# Patient Record
Sex: FEMALE | Race: WHITE | Employment: FULL TIME | URBAN - METROPOLITAN AREA
[De-identification: names, ages, dates, MRNs, and addresses within clinical notes are randomized per-mention and may not be internally consistent; named-entity substitution may affect disease eponyms.]

---

## 2017-09-01 ENCOUNTER — OFFICE VISIT (OUTPATIENT)
Dept: FAMILY MEDICINE CLINIC | Age: 42
End: 2017-09-01

## 2017-09-01 ENCOUNTER — HOSPITAL ENCOUNTER (OUTPATIENT)
Dept: LAB | Age: 42
Discharge: HOME | End: 2017-09-01
Payer: COMMERCIAL

## 2017-09-01 VITALS
RESPIRATION RATE: 12 BRPM | HEIGHT: 64 IN | SYSTOLIC BLOOD PRESSURE: 124 MMHG | OXYGEN SATURATION: 96 % | WEIGHT: 269 LBS | HEART RATE: 69 BPM | DIASTOLIC BLOOD PRESSURE: 85 MMHG | BODY MASS INDEX: 45.93 KG/M2 | TEMPERATURE: 97.8 F

## 2017-09-01 DIAGNOSIS — Z01.419 ENCOUNTER FOR ROUTINE GYNECOLOGICAL EXAMINATION WITH PAPANICOLAOU SMEAR OF CERVIX: Primary | ICD-10-CM

## 2017-09-01 DIAGNOSIS — Z13.29 SCREENING FOR THYROID DISORDER: ICD-10-CM

## 2017-09-01 DIAGNOSIS — Z12.4 SCREENING FOR MALIGNANT NEOPLASM OF CERVIX: ICD-10-CM

## 2017-09-01 DIAGNOSIS — Z12.39 SCREENING FOR MALIGNANT NEOPLASM OF BREAST: ICD-10-CM

## 2017-09-01 DIAGNOSIS — Z00.00 ENCOUNTER FOR ANNUAL PHYSICAL EXAM: ICD-10-CM

## 2017-09-01 DIAGNOSIS — L20.9 ATOPIC DERMATITIS, UNSPECIFIED TYPE: ICD-10-CM

## 2017-09-01 PROCEDURE — 88175 CYTOPATH C/V AUTO FLUID REDO: CPT | Performed by: NURSE PRACTITIONER

## 2017-09-01 RX ORDER — MOMETASONE FUROATE 1 MG/G
CREAM TOPICAL
Qty: 15 G | Refills: 1 | Status: SHIPPED | OUTPATIENT
Start: 2017-09-01

## 2017-09-01 NOTE — PROGRESS NOTES
Chief Complaint   Patient presents with    Complete Physical    Gyn Exam      Last PAP was 3-4 years ago and normal.     Denies any history of abnormal pap smear. Denies any vaginal discharge or irritation. Denies any family history of breast cancer. Mom had colon cancer, diagnosed at age 62. Will need colonoscopy at age 50. Pt is fasting this morning. Denies any cp, sob, and dyspnea. Denies any dizziness. Has had more frequent HAs. Last eye exam was 1.5-2 years ago. Denies any n/v/c/d. Denies any urinary sx. Denies any other concerns at this time. Chief Complaint   Patient presents with    Complete Physical    Gyn Exam     she is a 37y.o. year old female who presents for evalution. Reviewed PmHx, RxHx, FmHx, SocHx, AllgHx and updated and dated in the chart.     Review of Systems - negative except as listed above in the HPI    Objective:     Vitals:    09/01/17 1052   BP: 124/85   Pulse: 69   Resp: 12   Temp: 97.8 °F (36.6 °C)   SpO2: 96%   Weight: 269 lb (122 kg)   Height: 5' 3.78\" (1.62 m)     Physical Examination: General appearance - alert, well appearing, and in no distress  Mental status - normal mood, behavior, speech, dress, motor activity, and thought processes  Eyes - pupils equal and reactive, extraocular eye movements intact  Ears - bilateral TM's and external ear canals normal  Nose - normal and patent, no erythema, discharge or polyps and normal nontender sinuses  Mouth - mucous membranes moist, pharynx normal without lesions  Neck - supple, no significant adenopathy, carotids upstroke normal bilaterally, no bruits, thyroid exam: thyroid is normal in size without nodules or tenderness  Chest - clear to auscultation, no wheezes, rales or rhonchi, symmetric air entry  Heart - normal rate, regular rhythm, normal S1, S2, no murmurs  Extremities - peripheral pulses normal, no pedal edema, no clubbing or cyanosis  Skin - normal coloration and turgor, no rashes, no suspicious skin lesions noted    Pelvic exam: VULVA: normal appearing vulva with no masses, tenderness or lesions, VAGINA: normal appearing vagina with normal color and discharge, no lesions, CERVIX: normal appearing cervix without discharge or lesions. Assessment/ Plan:   Diagnoses and all orders for this visit:    1. Encounter for routine gynecological examination with Papanicolaou smear of cervix    2. Encounter for annual physical exam  -     LIPID PANEL  -     METABOLIC PANEL, COMPREHENSIVE  -     CBC WITH AUTOMATED DIFF  Enc to follow a healthy diet and exercise as tolerated. 3. Screening for malignant neoplasm of cervix  -     PAP IG, RFX APTIMA HPV ASCUS ([id number]))  Will notify results and deviate plan based on findings. 4. Screening for malignant neoplasm of breast  -     [facility] SCREENING INCL CAD; Future  Enc pt to schedule her mammogram.   5. Screening for thyroid disorder  -     TSH 3RD GENERATION  Screening, asx.   6. Atopic dermatitis, unspecified type  -     mometasone (ELOCON) 0.1 % topical cream; Apply thin layer to affected areas on legs daily as needed. Apply as directed. Reviewed other supportive measures. Follow up if sx persist or worsen. Follow-up Disposition: Not on File    I have discussed the diagnosis with the patient and the intended plan as seen in the above orders. The patient has received an after-visit summary and questions were answered concerning future plans. Medication Side Effects and Warnings were discussed with patient: yes  Patient Labs were reviewed and or requested: yes  Patient Past Records were reviewed and or requested  yes  Patient / Caregiver Understanding of treatment plan was verbalized during office visit YES    SERGO Kikr    There are no Patient Instructions on file for this visit.

## 2017-09-01 NOTE — PROGRESS NOTES
Chief Complaint   Patient presents with    Complete Physical    Gyn Exam      Last PAP was 3-4 years ago and normal.

## 2017-09-01 NOTE — MR AVS SNAPSHOT
Visit Information Date & Time Provider Department Dept. Phone Encounter #  
 9/1/2017 10:30 AM Emperatriz Ho NP [address] [facility] [phone] [id number] Upcoming Health Maintenance Date Due DTaP/Tdap/Td series (1 - Tdap) 9/15/1997 PAP AKA CERVICAL CYTOLOGY 9/15/1997 INFLUENZA AGE 9 TO ADULT 8/1/2017 Allergies as of 9/1/2017  Review Complete On: 9/1/2017 By: Emperatriz Ho NP No Known Allergies Current Immunizations  Never Reviewed No immunizations on file. Not reviewed this visit You Were Diagnosed With   
  
 Codes Comments Encounter for routine gynecological examination with Papanicolaou smear of cervix    -  Primary ICD-10-CM: N09.248 ICD-9-CM: V72.31, V76.2 Encounter for annual physical exam     ICD-10-CM: Z00.00 ICD-9-CM: V70.0 Screening for malignant neoplasm of cervix     ICD-10-CM: Z12.4 ICD-9-CM: V76.2 Screening for malignant neoplasm of breast     ICD-10-CM: Z12.39 
ICD-9-CM: V76.10 Screening for thyroid disorder     ICD-10-CM: Z13.29 ICD-9-CM: V77.0 Atopic dermatitis, unspecified type     ICD-10-CM: L20.9 ICD-9-CM: 691.8 Vitals BP Pulse Temp Resp Height(growth percentile) Weight(growth percentile) 124/85 69 97.8 °F (36.6 °C) 12 5' 3.78\" (1.62 m) 269 lb (122 kg) LMP SpO2 BMI OB Status Smoking Status 08/28/2017 (Approximate) 96% 46.49 kg/m2 Having regular periods Never Smoker Vitals History BMI and BSA Data Body Mass Index Body Surface Area  
 46.49 kg/m 2 2.34 m 2 Preferred Pharmacy Pharmacy Name Phone [id number], [address] [phone] Your Updated Medication List  
  
   
This list is accurate as of: 9/1/17 11:23 AM.  Always use your most recent med list.  
  
  
  
  
 mometasone 0.1 % topical cream  
Commonly known as:  Lyndy Meuse Apply thin layer to affected areas on legs daily as needed. Prescriptions Sent to Pharmacy Refills  
 mometasone (ELOCON) 0.1 % topical cream 1 Sig: Apply thin layer to affected areas on legs daily as needed. Class: Normal  
 Pharmacy: [facility] [address] Ph #: [phone] We Performed the Following CBC WITH AUTOMATED DIFF [66867 CPT(R)] LIPID PANEL [16744 CPT(R)] METABOLIC PANEL, COMPREHENSIVE [59600 CPT(R)] PAP IG, RFX APTIMA HPV ASCUS ([id number])) [[id number] Custom] TSH 3RD GENERATION [94707 CPT(R)] To-Do List   
 09/24/2017 Imaging:  [facility] SCREEN[facility] INCL CAD Introducing [facility]! Anival Gore introduces [employer] patient portal. Now you can access parts of your medical record, email your doctor's office, and request medication refills online. 1. In your internet browser, go to [email or web address]. [employer] 2. Click on the First Time User? Click Here link in the Sign In box. You will see the New Member Sign Up page. 3. Enter your [employer] Access Code exactly as it appears below. You will not need to use this code after youve completed the sign-up process. If you do not sign up before the expiration date, you must request a new code. · [employer] Access Code: [email or web address] Expires: 11/30/2017 10:53 AM 
 
4. Enter the last four digits of your Social Security Number (xxxx) and Date of Birth (mm/dd/yyyy) as indicated and click Submit. You will be taken to the next sign-up page. 5. Create a [employer] ID. This will be your [employer] login ID and cannot be changed, so think of one that is secure and easy to remember. 6. Create a [employer] password. You can change your password at any time. 7. Enter your Password Reset Question and Answer. This can be used at a later time if you forget your password. 8. Enter your e-mail address. You will receive e-mail notification when new information is available in [address]. 9. Click Sign Up.  You can now view and download portions of your medical record. 10. Click the Download Summary menu link to download a portable copy of your medical information. If you have questions, please visit the Frequently Asked Questions section of the "[employer] website. Remember, "[employer] is NOT to be used for urgent needs. For medical emergencies, dial 911. Now available from your iPhone and Android! Please provide this summary of care documentation to your next provider. Your primary care clinician is listed as Marti Vargas. If you have any questions after today's visit, please call [phone].

## 2017-09-02 LAB
ALBUMIN SERPL-MCNC: 4.1 G/DL (ref 3.5–5.5)
ALBUMIN/GLOB SERPL: 1.4 {RATIO} (ref 1.2–2.2)
ALP SERPL-CCNC: 61 IU/L (ref 39–117)
ALT SERPL-CCNC: 19 IU/L (ref 0–32)
AST SERPL-CCNC: 15 IU/L (ref 0–40)
BASOPHILS # BLD AUTO: 0 X10E3/UL (ref 0–0.2)
BASOPHILS NFR BLD AUTO: 0 %
BILIRUB SERPL-MCNC: 0.3 MG/DL (ref 0–1.2)
BUN SERPL-MCNC: 11 MG/DL (ref 6–24)
BUN/CREAT SERPL: 14 (ref 9–23)
CALCIUM SERPL-MCNC: 9.1 MG/DL (ref 8.7–10.2)
CHLORIDE SERPL-SCNC: 99 MMOL/L (ref 96–106)
CHOLEST SERPL-MCNC: 174 MG/DL (ref 100–199)
CO2 SERPL-SCNC: 25 MMOL/L (ref 18–29)
CREAT SERPL-MCNC: 0.79 MG/DL (ref 0.57–1)
EOSINOPHIL # BLD AUTO: 0.1 X10E3/UL (ref 0–0.4)
EOSINOPHIL NFR BLD AUTO: 1 %
ERYTHROCYTE [DISTWIDTH] IN BLOOD BY AUTOMATED COUNT: 13.8 % (ref 12.3–15.4)
GLOBULIN SER CALC-MCNC: 2.9 G/DL (ref 1.5–4.5)
GLUCOSE SERPL-MCNC: 87 MG/DL (ref 65–99)
HCT VFR BLD AUTO: 41.1 % (ref 34–46.6)
HDLC SERPL-MCNC: 45 MG/DL
HGB BLD-MCNC: 13.5 G/DL (ref 11.1–15.9)
IMM GRANULOCYTES # BLD: 0 X10E3/UL (ref 0–0.1)
IMM GRANULOCYTES NFR BLD: 0 %
INTERPRETATION, 910389: NORMAL
LDLC SERPL CALC-MCNC: 104 MG/DL (ref 0–99)
LYMPHOCYTES # BLD AUTO: 2 X10E3/UL (ref 0.7–3.1)
LYMPHOCYTES NFR BLD AUTO: 28 %
MCH RBC QN AUTO: 29 PG (ref 26.6–33)
MCHC RBC AUTO-ENTMCNC: 32.8 G/DL (ref 31.5–35.7)
MCV RBC AUTO: 88 FL (ref 79–97)
MONOCYTES # BLD AUTO: 0.6 X10E3/UL (ref 0.1–0.9)
MONOCYTES NFR BLD AUTO: 8 %
NEUTROPHILS # BLD AUTO: 4.5 X10E3/UL (ref 1.4–7)
NEUTROPHILS NFR BLD AUTO: 63 %
PLATELET # BLD AUTO: 250 X10E3/UL (ref 150–379)
POTASSIUM SERPL-SCNC: 4 MMOL/L (ref 3.5–5.2)
PROT SERPL-MCNC: 7 G/DL (ref 6–8.5)
RBC # BLD AUTO: 4.66 X10E6/UL (ref 3.77–5.28)
SODIUM SERPL-SCNC: 137 MMOL/L (ref 134–144)
TRIGL SERPL-MCNC: 126 MG/DL (ref 0–149)
TSH SERPL DL<=0.005 MIU/L-ACNC: 1.88 UIU/ML (ref 0.45–4.5)
VLDLC SERPL CALC-MCNC: 25 MG/DL (ref 5–40)
WBC # BLD AUTO: 7.3 X10E3/UL (ref 3.4–10.8)

## 2017-09-05 ENCOUNTER — TELEPHONE (OUTPATIENT)
Dept: FAMILY MEDICINE CLINIC | Age: 42
End: 2017-09-05

## 2017-09-05 NOTE — PROGRESS NOTES
Please notify pt the followin. Cholesterol looks pretty good. LDL is a little high but a few diet changes could help improve this. Will send letter with results and recommendations. 2. Normal thyroid function. All other labs are normal. I recommend we repeat fasting labs in 1 year. Sending letter with results and recs for her records.

## 2017-10-05 ENCOUNTER — HOSPITAL ENCOUNTER (OUTPATIENT)
Dept: MAMMOGRAPHY | Age: 42
Discharge: HOME | End: 2017-10-05
Payer: COMMERCIAL

## 2017-10-05 DIAGNOSIS — Z12.39 SCREENING FOR MALIGNANT NEOPLASM OF BREAST: ICD-10-CM

## 2017-10-05 PROCEDURE — 77067 SCR MAMMO BI INCL CAD: CPT

## 2022-04-11 ENCOUNTER — VIRTUAL VISIT (OUTPATIENT)
Dept: FAMILY MEDICINE CLINIC | Age: 47
End: 2022-04-11
Payer: COMMERCIAL

## 2022-04-11 DIAGNOSIS — M79.604 RIGHT LEG PAIN: Primary | ICD-10-CM

## 2022-04-11 PROCEDURE — 99203 OFFICE O/P NEW LOW 30 MIN: CPT | Performed by: STUDENT IN AN ORGANIZED HEALTH CARE EDUCATION/TRAINING PROGRAM

## 2022-04-11 NOTE — PROGRESS NOTES
Yenni Hdz is a 40 y.o. female , id x 2(name and ). Reviewed questionnaires, and  medications.     Chief Complaint   Patient presents with    Leg Pain     (R) leg pain x 3 weeks        3 most recent PHQ Screens 2017   Little interest or pleasure in doing things Not at all   Feeling down, depressed, irritable, or hopeless Not at all   Total Score PHQ 2 0

## 2022-04-11 NOTE — PROGRESS NOTES
Progress Note    she is a 40y.o. year old female who presents for evalution. Assessment/ Plan:   Diagnoses and all orders for this visit:    1. Right leg pain    History consistent with muscular pain  Low suspicion for DVT  Ultrasound to assess for baker's cyst  Encouraged to use NSAIDs, heat, and stretching    Follow-up and Dispositions    · Return for in office with fasting labs. I have discussed the diagnosis with the patient and the intended plan as seen in the above orders. The patient has received an after-visit summary and questions were answered concerning future plans. Pt conveyed understanding of plan. Medication Side Effects and Warnings were discussed with patient         Subjective:     Chief Complaint   Patient presents with    Leg Pain     (R) leg pain x 3 weeks      Last seen in 2017    Pain in R leg near the knee with radiation to the calf   Medial aspect and posterior aspect of R knee   Described as cramping and stiff. Trouble to bend her leg. No tenderness   If she sits wrong it will shoot up the back of her leg. Sitting in 's truck (shorter distance from floor to seat)   Better with leg extension. No changes based on time of day. No swelling, sensory changes, skin changes, or weakness noted. Does not recall any injury  No medication or other interventions tried. Tried dubon linament at night. Thinks it is menthol based. Has been just dealing with it for 3 weeks. No changes in that time. Reviewed PmHx, RxHx, FmHx, SocHx, AllgHx and updated and dated in the chart. Review of Systems - negative except as listed above in the HPI      Objective: There were no vitals filed for this visit. Current Outpatient Medications   Medication Sig    mometasone (ELOCON) 0.1 % topical cream Apply thin layer to affected areas on legs daily as needed.  (Patient not taking: Reported on 4/11/2022)     No current facility-administered medications for this visit.       Physical Exam  Vitals and nursing note reviewed. Constitutional:       General: She is not in acute distress. Appearance: Normal appearance. She is not ill-appearing, toxic-appearing or diaphoretic. HENT:      Head: Normocephalic and atraumatic. Eyes:      General: No scleral icterus. Right eye: No discharge. Left eye: No discharge. Conjunctiva/sclera: Conjunctivae normal.   Pulmonary:      Effort: Pulmonary effort is normal. No respiratory distress. Musculoskeletal:      Cervical back: No rigidity. Skin:     Coloration: Skin is not jaundiced or pale. Findings: No rash (Of the visualized areas). Neurological:      Mental Status: She is alert. Comments: No dysarthria, facial asymmetry, or other gross neurological deficit appreciated within limits of virtual encounter   Psychiatric:         Mood and Affect: Mood normal.         Behavior: Behavior normal.         Thought Content: Thought content normal.         Judgment: Judgment normal.               I was in the office while conducting this encounter. Total Time: minutes: 11-20 minutes. Ely Christian is a 40 y.o. female being evaluated by a Virtual Visit (video visit) encounter to address concerns as mentioned above. A caregiver was present when appropriate. Due to this being a TeleHealth encounter ([facility]), evaluation of the following organ systems was limited: Vitals/Constitutional/EENT/Resp/CV/GI//MS/Neuro/Skin/Heme-Lymph-Imm. Pursuant to the emergency declaration under the [address] [place] [address] and the [employer] Act, this Virtual Visit was conducted with patient's (and/or legal guardian's) consent, to reduce the risk of exposure to COVID-19 and provide necessary medical care.       Services were provided through a video synchronous discussion virtually to substitute for in-person encounter. --Luis Alberto Acosta MD on 4/20/2022 at 11:14 AM    An electronic signature was used to authenticate this note.

## 2022-04-27 ENCOUNTER — HOSPITAL ENCOUNTER (OUTPATIENT)
Dept: ULTRASOUND IMAGING | Age: 47
Discharge: HOME | End: 2022-04-27
Attending: STUDENT IN AN ORGANIZED HEALTH CARE EDUCATION/TRAINING PROGRAM
Payer: COMMERCIAL

## 2022-04-27 DIAGNOSIS — M79.604 RIGHT LEG PAIN: ICD-10-CM

## 2022-04-27 PROCEDURE — 76882 US LMTD JT/FCL EVL NVASC XTR: CPT

## 2022-04-29 NOTE — PROGRESS NOTES
No sign of cyst or fluid collection behind right knee to explain symptoms. Continue NSAIDs, heat, and stretching. Recommend to see ortho for failure to resolve.

## 2022-05-18 ENCOUNTER — OFFICE VISIT (OUTPATIENT)
Dept: ORTHOPEDIC SURGERY | Age: 47
End: 2022-05-18
Payer: COMMERCIAL

## 2022-05-18 ENCOUNTER — HOSPITAL ENCOUNTER (OUTPATIENT)
Dept: GENERAL RADIOLOGY | Age: 47
Discharge: HOME | End: 2022-05-18
Payer: COMMERCIAL

## 2022-05-18 VITALS
SYSTOLIC BLOOD PRESSURE: 140 MMHG | WEIGHT: 293 LBS | DIASTOLIC BLOOD PRESSURE: 85 MMHG | HEART RATE: 78 BPM | HEIGHT: 64 IN | TEMPERATURE: 97.9 F | BODY MASS INDEX: 50.02 KG/M2 | OXYGEN SATURATION: 98 %

## 2022-05-18 DIAGNOSIS — S96.091A: Primary | ICD-10-CM

## 2022-05-18 DIAGNOSIS — M25.561 RIGHT KNEE PAIN, UNSPECIFIED CHRONICITY: ICD-10-CM

## 2022-05-18 DIAGNOSIS — M25.561 RIGHT KNEE PAIN, UNSPECIFIED CHRONICITY: Primary | ICD-10-CM

## 2022-05-18 PROCEDURE — 73564 X-RAY EXAM KNEE 4 OR MORE: CPT

## 2022-05-18 PROCEDURE — 99203 OFFICE O/P NEW LOW 30 MIN: CPT | Performed by: ORTHOPAEDIC SURGERY

## 2022-05-18 NOTE — PROGRESS NOTES
5/18/2022      CC: Right calf pain    HPI:      This is a 40y.o. year old female who has a 2-month history of posterior pain in the right calf. She was ambulating and felt a pop in her posterior calf. She has had pain since then, its been better recently, she has been doing some massages. No other abnormalities or treatments. She is able to ambulate independently, her pain is managed. PMH:  History reviewed. No pertinent past medical history. PSxHx:  Past Surgical History:   Procedure Laterality Date    HX GASTRIC BYPASS  2000       Meds:    Current Outpatient Medications:     mometasone (ELOCON) 0.1 % topical cream, Apply thin layer to affected areas on legs daily as needed. (Patient not taking: Reported on 4/11/2022), Disp: 15 g, Rfl: 1    All:  No Known Allergies    Social Hx:  Social History     Socioeconomic History    Marital status:    Tobacco Use    Smoking status: Never Smoker    Smokeless tobacco: Never Used   Substance and Sexual Activity    Alcohol use: Yes    Drug use: No    Sexual activity: Not Currently       Family Hx:  Family History   Problem Relation Age of Onset   Deniseda Lakeridge Cancer Mother     No Known Problems Father     Diabetes Maternal Uncle          Review of Systems:       General: Denies headache, lethargy, fever, weight loss  Ears/Nose/Throat: Denies ear discharge, drainage, nosebleeds, hoarse voice, dental problems  Cardiovascular: Denies chest pain, shortness of breath  Lungs: Denies chest pain, breathing problems, wheezing, pneumonia  Stomach: Denies stomach pain, heartburn, constipation, irritable bowel  Skin: Denies rash, sores, open wounds  Musculoskeletal: Right calf pain  Genitourinary: Denies dysuria, hematuria, polyuria  Gastrointestinal: Denies constipation, obstipation, diarrhea  Neurological: Denies changes in sight, smell, hearing, taste, seizures. Denies loss of consciousness.   Psychiatric: Denies depression, sleep pattern changes, anxiety, change in personality  Endocrine: Denies mood swings, heat or cold intolerance  Hematologic/Lymphatic: Denies anemia, purpura, petechia  Allergic/Immunologic: Denies swelling of throat, pain or swelling at lymph nodes      Physical Examination:    Visit Vitals  BP (!) 140/85 (BP 1 Location: Left upper arm, BP Patient Position: Sitting, BP Cuff Size: Large adult)   Pulse 78   Temp 97.9 °F (36.6 °C) (Tympanic)   Ht 5' 3.78\" (1.62 m)   Wt 297 lb (134.7 kg)   SpO2 98%   BMI 51.33 kg/m²        General: AOX3, no apparent distress  Psychiatric: mood and affect appropriate  Lungs: breathing is symmetric and unlabored bilaterally  Heart: regular rate and rhythm  Abdomen: no guarding  Head: normocephalic, atraumatic  Skin: No significant abnormalities, good turgor  Sensation intact to light touch: C5-T1 dermatomes  Muscular exam: 5/5 strength in all major muscle groups unless noted in specialty exam.    Extremities          Right lower extremity: No gross deformity. There is tenderness to palpation in the posterior medial gastrocnemius musculotendinous junction area. Negative Nolan test.  No restriction to range of motion of the hip, knee, ankle. Muscle bulk is appropriate without wasting. Sensation is intact to light touch in the L1-S1 dermatomes. Capillary refill is less than 2 seconds in the fingers. Strength testing is 5/5 at the major muscle groups of the hip, knee, ankle. Left lower extremity:  No gross deformity. No restriction to range of motion of the hip, knee, ankle. Muscle bulk is appropriate without wasting. Sensation is intact to light touch in the L1-S1 dermatomes. Capillary refill is less than 2 seconds in the fingers. Strength testing is 5/5 at the major muscle groups of the hip, knee, ankle.           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 Assessment: Partial rupture, musculotendinous junction right gastrocnemius medial head  Plan: This patient has above-mentioned issue, in general we discussed the anatomy as well as the timeframe of recovery which would be 3 months or more, I will get her into a physical therapy program, she will ambulate to her tolerance, she can increase her activities to her overall pain control level. We will see her in 6 weeks for repeat clinical check, hopefully at that point she is essentially asymptomatic. Ms. Christal Sherwood has a reminder for a \"due or due soon\" health maintenance. I have asked that she contact her primary care provider for follow-up on this health maintenance.

## 2022-05-18 NOTE — PROGRESS NOTES
Identified pt with two pt identifiers (name and ). Reviewed chart in preparation for visit and have obtained necessary documentation. Camille Mason is a 40 y.o. female  Chief Complaint   Patient presents with    Knee Pain     RT Knee/calf     Visit Vitals  BP (!) 140/85 (BP 1 Location: Left upper arm, BP Patient Position: Sitting, BP Cuff Size: Large adult)   Pulse 78   Temp 97.9 °F (36.6 °C) (Tympanic)   Ht 5' 3.78\" (1.62 m)   Wt 297 lb (134.7 kg)   SpO2 98%   BMI 51.33 kg/m²     1. Have you been to the ER, urgent care clinic since your last visit? Hospitalized since your last visit? No    2. Have you seen or consulted any other health care providers outside of the [address] [place] [address] since your last visit? Include any pap smears or colon screening.  No

## 2022-10-27 ENCOUNTER — TELEPHONE (OUTPATIENT)
Dept: FAMILY MEDICINE CLINIC | Age: 47
End: 2022-10-27

## 2022-10-27 NOTE — TELEPHONE ENCOUNTER
----- Message from [facility] sent at 10/27/2022  8:25 AM EDT -----  Subject: Message to Provider    QUESTIONS  Information for Provider? Pt could not get into the building due to   construction. She was calling to cancel appt so she could not be charged   or consider no show.   ---------------------------------------------------------------------------  --------------  Cristine Ram [facility]  [phone]; OK to leave message on voicemail  ---------------------------------------------------------------------------  --------------  SCRIPT ANSWERS  Relationship to Patient?  Self

## 2022-10-31 ENCOUNTER — OFFICE VISIT (OUTPATIENT)
Dept: FAMILY MEDICINE CLINIC | Age: 47
End: 2022-10-31
Payer: COMMERCIAL

## 2022-10-31 VITALS
HEIGHT: 64 IN | WEIGHT: 293 LBS | OXYGEN SATURATION: 98 % | HEART RATE: 80 BPM | BODY MASS INDEX: 50.02 KG/M2 | DIASTOLIC BLOOD PRESSURE: 84 MMHG | RESPIRATION RATE: 18 BRPM | SYSTOLIC BLOOD PRESSURE: 142 MMHG | TEMPERATURE: 98 F

## 2022-10-31 DIAGNOSIS — R03.0 ELEVATED BLOOD PRESSURE READING IN OFFICE WITHOUT DIAGNOSIS OF HYPERTENSION: ICD-10-CM

## 2022-10-31 DIAGNOSIS — Z11.59 ENCOUNTER FOR HEPATITIS C SCREENING TEST FOR LOW RISK PATIENT: ICD-10-CM

## 2022-10-31 DIAGNOSIS — E66.01 MORBID OBESITY WITH BMI OF 50.0-59.9, ADULT (HCC): ICD-10-CM

## 2022-10-31 DIAGNOSIS — Z80.0 FAMILY HISTORY OF COLON CANCER IN MOTHER: ICD-10-CM

## 2022-10-31 DIAGNOSIS — Z12.11 SCREENING FOR MALIGNANT NEOPLASM OF COLON: ICD-10-CM

## 2022-10-31 DIAGNOSIS — Z00.00 ENCOUNTER FOR ANNUAL PHYSICAL EXAM: Primary | ICD-10-CM

## 2022-10-31 PROCEDURE — 99396 PREV VISIT EST AGE 40-64: CPT | Performed by: NURSE PRACTITIONER

## 2022-10-31 NOTE — PROGRESS NOTES
Chief Complaint   Patient presents with    Physical    Labs    Form Completion       1. Patient in office today for cpe and fasting labs. Pt needs form completed for insurance. Have gyn exam in February with provider. Pt declines flu shot. Have no concerns. 1. Have you been to the ER, urgent care clinic since your last visit? Hospitalized since your last visit? No    2. Have you seen or consulted any other health care providers outside of the [address] [place] [address] since your last visit? Include any pap smears or colon screening.  No

## 2022-10-31 NOTE — PATIENT INSTRUCTIONS
Try eating a high protein breakfast. I recommend EGGS since they are high in protein. Try eating 1 egg with 2 egg whites. That should keep you feeling nice and full all morning. You could also try nonfat greek yogurt with a small amount of the trail mix or granola. I recommend eating an apple as a mid morning snack. You can add a little bit of peanut butter or almond butter (less sweet option) for the extra protein. For lunch I recommend a protein like fish, chicken, turkey breast, etc. If you are going to have a sandwich try whole grain bread and start with half a sandwich to see if that satiates you. I think wraps (like spinach wraps) are better. You can put as many veggies on it as you want like onion, tomato, lettuce. Try mustard instead of jones because jones is high in fat. If you need a crunchy/salty side try pop chips or fleipa chips. They are less fattening that potato chips. For a mid afternoon snack I recommend carrots with humus. Humus is so good for you and so filling due to the high protein. For dinner stick with the protein like fish, chicken or turkey. For starch I recommend quinoa, brown rice, sweet potato, or cous cous. Always eat a vegetable with your dinner. Spinach and kale are great because they fill you up and are full of vitamins and minerals. I know that its difficult to stop craving sweets in the evening. For this I recommend purple or green grapes because they will curb your craving. Pineapple is another delicious alternative. Another idea is any of those \"100 calorie\" desserts like skinny cow. Just make sure you only have one. Drink as much water as you possibly can all day long. Try adding fresh slices of lemon to curb cravings. Also purchase abraham seeds. If you sprinkle them on your food, they \"blow up\" in the stomach and make you feel cast faster.  I usually put in on yogurt or peanut butter toast. You can really put it on anything and you can find them pretty much everywhere these days. You should join [employer] at www.[employer].com. They have an endless supply of healthy recipes. Get a fit bit to track your steps. Try getting 10,000 steps per day. I recommend the [employer]. Consider joining [employer]! It's a great tool to help target what your barriers to weight loss may be. You can track your caloric intake and use the calorie budget. You can track energy expenditure and make sure you're burning enough calories throughout the day. It can help you set short and long term goals to achiever your target weight and guide you on how to keep the weight off!  It's a very useful tool :)

## 2022-10-31 NOTE — PROGRESS NOTES
Chief Complaint   Patient presents with    Physical    Labs    Form Completion     Patient in office today for cpe and fasting labs. Pt needs form completed for insurance. Have gyn exam in February with provider. Denies any history of abnormal pap smea. Pt declines flu shot. Denies any cp, sob, and dyspnea. Denies any ha or dizziness. Denies n/v/c/d. Denies any urinary sx. Health Maintenance Due   Topic Date Due    Hepatitis C Screening  Never done    COVID-19 Vaccine (1) 03/15/1976    DTaP/Tdap/Td series (1 - Tdap) Never done    Colorectal Cancer Screening Combo  Never done    Flu Vaccine (1) Never done    Cervical cancer screen  09/01/2022    Lipid Screen  09/01/2022     Mother had colon cancer. They recommended she get screened at 50, mom diagnosed at age 62. Denies any blood in the stool. Had a mammogram that was normal.     Diet and lifestyle could use improvement. Has previously done keto with great results but hard to maintain. Chief Complaint   Patient presents with    Physical    Labs    Form Completion     she is a 56y.o. year old female who presents for evalution. Reviewed PmHx, RxHx, FmHx, SocHx, AllgHx and updated and dated in the chart. Review of Systems - negative except as listed above in the HPI    Current Outpatient Medications   Medication Instructions    mometasone (ELOCON) 0.1 % topical cream Apply thin layer to affected areas on legs daily as needed.         Objective:     Vitals:    10/31/22 0940   BP: (!) 142/84   Pulse: 80   Resp: 18   Temp: 98 °F (36.7 °C)   TempSrc: Oral   SpO2: 98%   Weight: 298 lb (135.2 kg)   Height: 5' 3.78\" (1.62 m)     Physical Examination: General appearance - alert, well appearing, and in no distress  Mental status - normal mood, behavior  Eyes - pupils equal and reactive, extraocular eye movements intact  Ears - bilateral TM's and external ear canals normal  Nose - normal and patent, no erythema, discharge or polyps and normal nontender sinuses  Mouth - mucous membranes moist, pharynx normal without lesions  Neck - supple, no significant adenopathy, carotids upstroke normal bilaterally, no bruits, thyroid exam: thyroid is normal in size without nodules or tenderness  Chest - clear to auscultation, no wheezes, rales or rhonchi, symmetric air entry  Heart - normal rate, regular rhythm, normal S1, S2  Abdomen - soft, nontender, nondistended, no masses or organomegaly  bowel sounds normal  Extremities - peripheral pulses normal, trace ankle edema, no clubbing or cyanosis  Skin - normal coloration and turgor    Assessment/ Plan:   Diagnoses and all orders for this visit:    1. Encounter for annual physical exam  -     LIPID PANEL; Future  -     METABOLIC PANEL, COMPREHENSIVE; Future  -     CBC WITH AUTOMATED DIFF; Future  -     TSH 3RD GENERATION; Future  -     HEMOGLOBIN A1C WITH EAG; Future  -     VITAMIN D, 25 HYDROXY; Future    2. Morbid obesity with BMI of 50.0-59.9, adult [facility])  The patient is asked to modify diet and lifestyle to facilitate weight loss and therefore avoid health risks that are associated with obesity. 3. Elevated blood pressure reading in office without diagnosis of hypertension  Slightly high BP today. Recommended she follow up in 4 weeks to recheck. If persistently high consider diuretic to also help improve her slight LE swelling / fluid retention. 4. Encounter for hepatitis C screening test for low risk patient  -     HEPATITIS C AB; Future  Screening, asx.   5. Family history of colon cancer in mother / 10. Screening for malignant neoplasm of colon  -     REFERRAL TO GASTROENTEROLOGY  Referral to GI to [facility] for initial screening colonoscopy. Follow-up and Dispositions    Return in about 4 weeks (around 11/28/2022) for recheck BP. I have discussed the diagnosis with the patient and the intended plan as seen in the above orders.   The patient has received an after-visit summary and questions were answered concerning future plans. Medication Side Effects and Warnings were discussed with patient: yes  Patient Labs were reviewed and or requested: yes  Patient Past Records were reviewed and or requested  yes  Patient / Caregiver Understanding of treatment plan was verbalized during office visit SERGO Villar    Patient Instructions   Try eating a high protein breakfast. I recommend EGGS since they are high in protein. Try eating 1 egg with 2 egg whites. That should keep you feeling nice and full all morning. You could also try nonfat greek yogurt with a small amount of the trail mix or granola. I recommend eating an apple as a mid morning snack. You can add a little bit of peanut butter or almond butter (less sweet option) for the extra protein. For lunch I recommend a protein like fish, chicken, turkey breast, etc. If you are going to have a sandwich try whole grain bread and start with half a sandwich to see if that satiates you. I think wraps (like spinach wraps) are better. You can put as many veggies on it as you want like onion, tomato, lettuce. Try mustard instead of jones because jones is high in fat. If you need a crunchy/salty side try pop chips or felipa chips. They are less fattening that potato chips. For a mid afternoon snack I recommend carrots with humus. Humus is so good for you and so filling due to the high protein. For dinner stick with the protein like fish, chicken or turkey. For starch I recommend quinoa, brown rice, sweet potato, or cous cous. Always eat a vegetable with your dinner. Spinach and kale are great because they fill you up and are full of vitamins and minerals. I know that its difficult to stop craving sweets in the evening. For this I recommend purple or green grapes because they will curb your craving. Pineapple is another delicious alternative. Another idea is any of those \"100 calorie\" desserts like skinny cow.  Just make sure you only have one.     Drink as much water as you possibly can all day long. Try adding fresh slices of lemon to curb cravings. Also purchase abraham seeds. If you sprinkle them on your food, they \"blow up\" in the stomach and make you feel cast faster. I usually put in on yogurt or peanut butter toast. You can really put it on anything and you can find them pretty much everywhere these days. You should join [employer] at www.pinterest.com. They have an endless supply of healthy recipes. Get a fit bit to track your steps. Try getting 10,000 steps per day. I recommend the [employer]. Consider joining [employer]! It's a great tool to help target what your barriers to weight loss may be. You can track your caloric intake and use the calorie budget. You can track energy expenditure and make sure you're burning enough calories throughout the day. It can help you set short and long term goals to achiever your target weight and guide you on how to keep the weight off!  It's a very useful tool :)

## 2022-11-01 LAB
25(OH)D3+25(OH)D2 SERPL-MCNC: 15.1 NG/ML (ref 30–100)
ALBUMIN SERPL-MCNC: 4.2 G/DL (ref 3.8–4.8)
ALBUMIN/GLOB SERPL: 1.4 {RATIO} (ref 1.2–2.2)
ALP SERPL-CCNC: 89 IU/L (ref 44–121)
ALT SERPL-CCNC: 17 IU/L (ref 0–32)
AST SERPL-CCNC: 14 IU/L (ref 0–40)
BASOPHILS # BLD AUTO: 0 X10E3/UL (ref 0–0.2)
BASOPHILS NFR BLD AUTO: 1 %
BILIRUB SERPL-MCNC: <0.2 MG/DL (ref 0–1.2)
BUN SERPL-MCNC: 11 MG/DL (ref 6–24)
BUN/CREAT SERPL: 16 (ref 9–23)
CALCIUM SERPL-MCNC: 9 MG/DL (ref 8.7–10.2)
CHLORIDE SERPL-SCNC: 105 MMOL/L (ref 96–106)
CHOLEST SERPL-MCNC: 174 MG/DL (ref 100–199)
CO2 SERPL-SCNC: 21 MMOL/L (ref 20–29)
CREAT SERPL-MCNC: 0.7 MG/DL (ref 0.57–1)
EGFR: 108 ML/MIN/1.73
EOSINOPHIL # BLD AUTO: 0.1 X10E3/UL (ref 0–0.4)
EOSINOPHIL NFR BLD AUTO: 2 %
ERYTHROCYTE [DISTWIDTH] IN BLOOD BY AUTOMATED COUNT: 15.5 % (ref 11.7–15.4)
EST. AVERAGE GLUCOSE BLD GHB EST-MCNC: 194 MG/DL
GLOBULIN SER CALC-MCNC: 3 G/DL (ref 1.5–4.5)
GLUCOSE SERPL-MCNC: 152 MG/DL (ref 70–99)
HBA1C MFR BLD: 8.4 % (ref 4.8–5.6)
HCT VFR BLD AUTO: 37.7 % (ref 34–46.6)
HCV AB S/CO SERPL IA: <0.1 S/CO RATIO (ref 0–0.9)
HDLC SERPL-MCNC: 42 MG/DL
HGB BLD-MCNC: 11.5 G/DL (ref 11.1–15.9)
IMM GRANULOCYTES # BLD AUTO: 0 X10E3/UL (ref 0–0.1)
IMM GRANULOCYTES NFR BLD AUTO: 1 %
IMP & REVIEW OF LAB RESULTS: NORMAL
LDLC SERPL CALC-MCNC: 113 MG/DL (ref 0–99)
LYMPHOCYTES # BLD AUTO: 1.4 X10E3/UL (ref 0.7–3.1)
LYMPHOCYTES NFR BLD AUTO: 22 %
MCH RBC QN AUTO: 24.1 PG (ref 26.6–33)
MCHC RBC AUTO-ENTMCNC: 30.5 G/DL (ref 31.5–35.7)
MCV RBC AUTO: 79 FL (ref 79–97)
MONOCYTES # BLD AUTO: 0.5 X10E3/UL (ref 0.1–0.9)
MONOCYTES NFR BLD AUTO: 7 %
NEUTROPHILS # BLD AUTO: 4.4 X10E3/UL (ref 1.4–7)
NEUTROPHILS NFR BLD AUTO: 67 %
PLATELET # BLD AUTO: 317 X10E3/UL (ref 150–450)
POTASSIUM SERPL-SCNC: 4.4 MMOL/L (ref 3.5–5.2)
PROT SERPL-MCNC: 7.2 G/DL (ref 6–8.5)
RBC # BLD AUTO: 4.77 X10E6/UL (ref 3.77–5.28)
SODIUM SERPL-SCNC: 143 MMOL/L (ref 134–144)
TRIGL SERPL-MCNC: 103 MG/DL (ref 0–149)
TSH SERPL DL<=0.005 MIU/L-ACNC: 1.48 UIU/ML (ref 0.45–4.5)
VLDLC SERPL CALC-MCNC: 19 MG/DL (ref 5–40)
WBC # BLD AUTO: 6.5 X10E3/UL (ref 3.4–10.8)

## 2022-11-03 ENCOUNTER — DOCUMENTATION ONLY (OUTPATIENT)
Dept: FAMILY MEDICINE CLINIC | Age: 47
End: 2022-11-03

## 2022-11-03 NOTE — PROGRESS NOTES
Faxed biometric screening form to [address] [place] [address] @ [phone]. Confirmation number [id number]. Original form placed in scan folder for central scanning.

## 2022-11-07 ENCOUNTER — DOCUMENTATION ONLY (OUTPATIENT)
Dept: FAMILY MEDICINE CLINIC | Age: 47
End: 2022-11-07

## 2022-11-07 ENCOUNTER — OFFICE VISIT (OUTPATIENT)
Dept: FAMILY MEDICINE CLINIC | Age: 47
End: 2022-11-07
Payer: COMMERCIAL

## 2022-11-07 VITALS
HEART RATE: 89 BPM | RESPIRATION RATE: 18 BRPM | WEIGHT: 293 LBS | OXYGEN SATURATION: 96 % | BODY MASS INDEX: 50.02 KG/M2 | TEMPERATURE: 98.1 F | HEIGHT: 64 IN | SYSTOLIC BLOOD PRESSURE: 126 MMHG | DIASTOLIC BLOOD PRESSURE: 80 MMHG

## 2022-11-07 DIAGNOSIS — E11.65 TYPE 2 DIABETES MELLITUS WITH HYPERGLYCEMIA, WITHOUT LONG-TERM CURRENT USE OF INSULIN (HCC): Primary | ICD-10-CM

## 2022-11-07 PROBLEM — R03.0 ELEVATED BLOOD PRESSURE READING IN OFFICE WITHOUT DIAGNOSIS OF HYPERTENSION: Status: RESOLVED | Noted: 2022-10-31 | Resolved: 2022-11-07

## 2022-11-07 PROCEDURE — 99213 OFFICE O/P EST LOW 20 MIN: CPT | Performed by: NURSE PRACTITIONER

## 2022-11-07 PROCEDURE — 3052F HG A1C>EQUAL 8.0%<EQUAL 9.0%: CPT | Performed by: NURSE PRACTITIONER

## 2022-11-07 RX ORDER — DAPAGLIFLOZIN AND METFORMIN HYDROCHLORIDE 5; 500 MG/1; MG/1
1 TABLET, FILM COATED, EXTENDED RELEASE ORAL DAILY
Qty: 90 EACH | Refills: 1 | Status: SHIPPED | OUTPATIENT
Start: 2022-11-07 | End: 2022-11-10 | Stop reason: ALTCHOICE

## 2022-11-07 NOTE — PROGRESS NOTES
Chief Complaint   Patient presents with    Abnormal Lab Results    Medication Evaluation       1. Patient in office today to discuss lab results and medication management for labs. Have no concerns. 1. Have you been to the ER, urgent care clinic since your last visit? Hospitalized since your last visit? No    2. Have you seen or consulted any other health care providers outside of the [address] [place] [address] since your last visit? Include any pap smears or colon screening.  No

## 2022-11-07 NOTE — PROGRESS NOTES
PA has been submitted for Xigduo XR 5-500mg on 11/7/2022 via [employer]. PA Case ID: [id number]  Will be notified of outcome via fax in 48-72 hrs.

## 2022-11-07 NOTE — PROGRESS NOTES
Chief Complaint   Patient presents with    Abnormal Lab Results    Medication Evaluation     Patient in office today to discuss lab results and medication management for labs. Hgb a1c was 8.4 when pt came in for biometric screening. No previous history of diabetes. Does have a family history. Sees eye doctor yearly. Denies any other concerns at this time. Chief Complaint   Patient presents with    Abnormal Lab Results    Medication Evaluation     she is a 56y.o. year old female who presents for evalution. Reviewed PmHx, RxHx, FmHx, SocHx, AllgHx and updated and dated in the chart. Review of Systems - negative except as listed above in the HPI    Current Outpatient Medications   Medication Instructions    dapagliflozin-metformin (Xigduo XR) 5-500 mg TBph 1 Tablet, Oral, DAILY    mometasone (ELOCON) 0.1 % topical cream Apply thin layer to affected areas on legs daily as needed. Objective:     Vitals:    11/07/22 1448   BP: 126/80   Pulse: 89   Resp: 18   Temp: 98.1 °F (36.7 °C)   TempSrc: Oral   SpO2: 96%   Weight: 298 lb (135.2 kg)   Height: 5' 3.78\" (1.62 m)     Physical Examination: General appearance - alert, well appearing, and in no distress    Assessment/ Plan:   Diagnoses and all orders for this visit:    1. Type 2 diabetes mellitus with hyperglycemia, without long-term current use of insulin (HCC)  -     dapagliflozin-metformin (Xigduo XR) 5-500 mg TBph; Take 1 Tablet by mouth daily. Start xigduo daily. Reviewed SEs/ADRs of medication. Discussed diagnosis, treatment, goals of care, importance of diet and lifestyle, low carb diet recommendation, micro and macrovascular complications associated with poorly controlled type2 diabetes. Pt is motivated to get this number down. Follow up in 3 months for next set of labs. If no sig improvement agreeable to seeing diabetic educator and daily glucose monitoring.      Follow-up and Dispositions    Return in about 3 months (around 2/7/2023). I have discussed the diagnosis with the patient and the intended plan as seen in the above orders. The patient has received an after-visit summary and questions were answered concerning future plans. Medication Side Effects and Warnings were discussed with patient: yes  Patient Labs were reviewed and or requested: yes  Patient Past Records were reviewed and or requested  yes  Patient / Caregiver Understanding of treatment plan was verbalized during office visit YES    SERGO Monroy    There are no Patient Instructions on file for this visit.

## 2022-11-10 ENCOUNTER — DOCUMENTATION ONLY (OUTPATIENT)
Dept: FAMILY MEDICINE CLINIC | Age: 47
End: 2022-11-10

## 2022-11-10 DIAGNOSIS — E11.65 TYPE 2 DIABETES MELLITUS WITH HYPERGLYCEMIA, WITHOUT LONG-TERM CURRENT USE OF INSULIN (HCC): Primary | ICD-10-CM

## 2022-11-10 RX ORDER — METFORMIN HYDROCHLORIDE 500 MG/1
TABLET, EXTENDED RELEASE ORAL
Qty: 60 TABLET | Refills: 2 | Status: SHIPPED | OUTPATIENT
Start: 2022-11-10

## 2022-11-10 NOTE — PROGRESS NOTES
PA for Xidguo XR has been denied by insurance company,pt needs to have tried and failed metformin or metformin XR therapy.

## 2023-01-06 DIAGNOSIS — E11.65 TYPE 2 DIABETES MELLITUS WITH HYPERGLYCEMIA, WITHOUT LONG-TERM CURRENT USE OF INSULIN (HCC): ICD-10-CM

## 2023-01-06 RX ORDER — METFORMIN HYDROCHLORIDE 500 MG/1
TABLET, EXTENDED RELEASE ORAL
Qty: 60 TABLET | Refills: 2 | Status: SHIPPED | OUTPATIENT
Start: 2023-01-06

## 2023-02-19 ENCOUNTER — HOSPITAL ENCOUNTER (EMERGENCY)
Age: 48
Discharge: HOME | End: 2023-02-19
Attending: EMERGENCY MEDICINE
Payer: COMMERCIAL

## 2023-02-19 VITALS
RESPIRATION RATE: 18 BRPM | DIASTOLIC BLOOD PRESSURE: 78 MMHG | SYSTOLIC BLOOD PRESSURE: 138 MMHG | WEIGHT: 280 LBS | HEART RATE: 84 BPM | BODY MASS INDEX: 47.8 KG/M2 | HEIGHT: 64 IN | TEMPERATURE: 99 F | OXYGEN SATURATION: 100 %
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
## 2023-02-19 PROCEDURE — 99283 EMERGENCY DEPT VISIT LOW MDM: CPT

## 2023-02-19 RX ORDER — AMOXICILLIN AND CLAVULANATE POTASSIUM 875; 125 MG/1; MG/1
1 TABLET, FILM COATED ORAL 2 TIMES DAILY
Qty: 20 TABLET | Refills: 0 | Status: SHIPPED | OUTPATIENT
Start: 2023-02-19 | End: 2023-03-01

## 2023-02-19 NOTE — ED TRIAGE NOTES
Pt c/o cat bite to left middle and ring finger; bite occurred this morning; cat belongs to pt; not concerned about rabies

## 2023-02-19 NOTE — DISCHARGE INSTRUCTIONS
Your blood pressure was elevated in the emergency department today, monitor your blood pressure at home by taking it no more than 3 times a week when you are sitting, feel rested, and feel calm. Keep a record of these blood pressure readings and follow-up with your primary care doctor. Please return to the emergency department if you experience headache, lightheadedness/dizziness, chest pain, difficulty breathing. A prescription for antibiotics was sent to your pharmacy, it is important that you complete these antibiotics even if you start to feel better before they are finished. Please return to the emergency department if you develop fever, increased redness/swelling to your fingers, unable to bend your fingers, red streaking down your arm, if your symptoms do not start to improve after taking antibiotics for 48 hours, or for any other symptoms that are concerning to you. You declined the rabies vaccine/immunoglobulin in the ER today because the bite was from your cat and there was no concern for rabies. Please return to the emergency department soon as possible if you change your mind about this.

## 2023-02-19 NOTE — ED PROVIDER NOTES
[facility] EMERGENCY DEPARTMENT  EMERGENCY DEPARTMENT HISTORY AND PHYSICAL EXAM      Date: 2/19/2023  Patient Name: Jamee Steele  MRN: [id number]  YOB: 1975  Date of evaluation: 2/19/2023  Provider: Mervin Paget, NP   Note Started: 1:00 PM 2/19/23    HISTORY OF PRESENT ILLNESS     Chief Complaint   Patient presents with    Animal Bite       History Provided By: Patient    HPI: Jamee Steele, 56 y.o. female with past medical history of diabetes, presents ambulatory into the emergency department accompanied by her significant other, with complaints of cat bite to left third and fourth fingers. Patient reports startling her cat, and bit her earlier this morning. She states the cat is an indoor animal and has not had any exposure to any other animals, nor has it been outside; she is not sure when the cat's last rabies vaccine was, however she is declining rabies vaccines/immunoglobulin in the emergency department today because she feels confident that her cat has not been exposed to rabies. Denies any other injuries and reports being in her usual state of health. Upon arrival to the ED pt is alert and oriented x 3, well-appearing, and interacting appropriately; no obvious distress noted.       PAST MEDICAL HISTORY   Past Medical History:  Past Medical History:   Diagnosis Date    Diabetes ([address]        Past Surgical History:  Past Surgical History:   Procedure Laterality Date    HX BARIATRIC SURGERY      HX GASTRIC BYPASS  2000       Family History:  Family History   Problem Relation Age of Onset    Cancer Mother     No Known Problems Father     Diabetes Maternal Uncle        Social History:  Social History     Tobacco Use    Smoking status: Never    Smokeless tobacco: Never   Substance Use Topics    Alcohol use: Yes    Drug use: Never       Allergies:  No Known Allergies    PCP: Bridgette Meza NP    Current Meds:   Discharge Medication List as of 2/19/2023  1:14 PM        CONTINUE these medications which have NOT CHANGED    Details   metFORMIN ER (GLUCOPHAGE XR) 500 mg tablet TAKE 1 TAB BY MOUTH DAILY WITH DINNER. INCREASE TO 2 TABS ON DAY 8 IF TOLERATED., Normal, Disp-60 Tablet, R-2             REVIEW OF SYSTEMS   Review of Systems   Constitutional:  Negative for fever. Musculoskeletal:         Pain/wounds to distal left third and fourth fingers. Denies sensory changes. Skin:  Positive for wound. Superficial cat bites to distal left third and fourth fingers. Denies any other injuries. Positives and Pertinent negatives as per HPI. PHYSICAL EXAM     ED Triage Vitals [02/19/23 1239]   ED Encounter Vitals Group      BP (!) 183/89      Pulse (Heart Rate) 84      Resp Rate 18      Temp 99 °F (37.2 °C)      Temp src       O2 Sat (%) 100 %      Weight 280 lb      Height 5' 4\"      Physical Exam  Constitutional:       General: She is not in acute distress. Appearance: Normal appearance. She is not ill-appearing, toxic-appearing or diaphoretic. Skin:     General: Skin is warm and dry. Capillary Refill: Capillary refill takes less than 2 seconds. Comments: Superficial cat bite to left distal third and fourth fingers, mild swelling noted. No wounds drainage or erythema. Full range of motion to all digits, neurovascularly intact. Neurological:      Mental Status: She is alert and oriented to person, place, and time. Psychiatric:         Mood and Affect: Mood normal.         Behavior: Behavior normal.         Thought Content: Thought content normal.         Judgment: Judgment normal.       SCREENINGS               No data recorded      LAB, EKG AND DIAGNOSTIC RESULTS       PROCEDURES   Unless otherwise noted below, none.   Performed by: Nicole Young NP   Procedures      CRITICAL CARE TIME       ED COURSE and DIFFERENTIAL DIAGNOSIS/MDM   Vitals:    Vitals:    02/19/23 1239 02/19/23 1314   BP: (!) 183/89 138/78   Pulse: 84    Resp: 18    Temp: 99 °F (37.2 °C)    SpO2: 100%    Weight: 127 kg (280 lb)    Height: 5' 4\" (1.626 m)         Patient was given the following medications:  Medications - No data to display      CONSULTS: (Who and What was discussed)  None     Chronic Conditions: Diabetes  Social Determinants affecting Dx or Tx: None  Counseling: HYPERTENSION COUNSELING: Education was provided to the patient today regarding their hypertension. Patient is made aware of their elevated blood pressure and is instructed to follow up this week with their Primary Care for a recheck. Patient is counseled regarding consequences of chronic, uncontrolled hypertension including kidney disease, heart disease, stroke or even death. Patient states their understanding and agrees to follow up this week. Additionally, during their visit, I discussed sodium restriction, maintaining ideal body weight and regular exercise program as physiologic means to achieve blood pressure control. The patient will strive towards this. Records Reviewed (source and summary of external notes): Prior medical records and Nursing notes    CC/HPI Summary, DDx, ED Course, and Reassessment. Disposition Considerations (Tests not done, Shared Decision Making, Pt Expectation of Test or Treatment.):   presents ambulatory into the emergency department accompanied by her significant other, with complaints of cat bite to left third and fourth fingers. Patient reports startling her cat, and bit her earlier this morning. She states the cat is an indoor animal and has not had any exposure to any other animals, nor has it been outside; she is not sure when the cat's last rabies vaccine was, however she is declining rabies vaccines/immunoglobulin in the emergency department today because she feels confident that her cat has not been exposed to rabies. Denies any other injuries and reports being in her usual state of health. No visible or palpable foreign bodies noted; no imaging necessary at this time.   Full range of motion to all digits and neurovascularly intact. Wounds appear superficial, patient provided wound care prior to arrival.  Will prescribe 10 days of Augmentin, advised return precautions and PCP follow-up. Patient declining any pain medication. Shared decision making utilized, patient and her significant other are in agreement with plan of care. Declined rabies vaccine/immunoglobulin; informed patient about the risks associated with refusing this treatment, she and her  both verbalized understanding and continued to decline. Elevated BP reading in the ED today (183/89); patient asymptomatic. Blood pressure reading on 11/7/2022 at PCPs office was 126/80. Discussed the need to record her blood pressure readings throughout the week and follow-up with PCP regarding this. Advised return precautions and she verbalized understanding. FINAL IMPRESSION     1. Cat bite, initial encounter          DISPOSITION/PLAN   Discharged    Discharge Note: The patient is stable for discharge home. The signs, symptoms, diagnosis, and discharge instructions have been discussed, understanding conveyed, and agreed upon. The patient is to follow up as recommended or return to ER should their symptoms worsen. PATIENT REFERRED TO:  Follow-up Information       Follow up With Specialties Details Why Contact Info    Todd Arceo NP Nurse Practitioner Schedule an appointment as soon as possible for a visit in 2 days Reevaluation of cat bite and elevated blood pressure reading [address]  [address]  [phone]                DISCHARGE MEDICATIONS:  Discharge Medication List as of 2/19/2023  1:14 PM        START taking these medications    Details   amoxicillin-clavulanate (Augmentin) 875-125 mg per tablet Take 1 Tablet by mouth two (2) times a day for 10 days. , Normal, Disp-20 Tablet, R-0           CONTINUE these medications which have NOT CHANGED    Details   metFORMIN ER (GLUCOPHAGE XR) 500 mg tablet TAKE 1 TAB BY MOUTH DAILY WITH DINNER. INCREASE TO 2 TABS ON DAY 8 IF TOLERATED., Normal, Disp-60 Tablet, R-2               DISCONTINUED MEDICATIONS:  Discharge Medication List as of 2/19/2023  1:14 PM          I am the Primary Clinician of Record: Mary Beth Aguiar NP (electronically signed)    (Please note that parts of this dictation were completed with voice recognition software. Quite often unanticipated grammatical, syntax, homophones, and other interpretive errors are inadvertently transcribed by the computer software. Please disregards these errors.  Please excuse any errors that have escaped final proofreading.)

## 2023-03-30 ENCOUNTER — PATIENT MESSAGE (OUTPATIENT)
Dept: FAMILY MEDICINE CLINIC | Age: 48
End: 2023-03-30

## 2023-03-30 DIAGNOSIS — E11.65 TYPE 2 DIABETES MELLITUS WITH HYPERGLYCEMIA, WITHOUT LONG-TERM CURRENT USE OF INSULIN (HCC): ICD-10-CM

## 2023-04-02 RX ORDER — METFORMIN HYDROCHLORIDE 500 MG/1
TABLET, EXTENDED RELEASE ORAL
Qty: 60 TABLET | Refills: 2 | Status: SHIPPED | OUTPATIENT
Start: 2023-04-02

## 2023-07-20 ENCOUNTER — TELEPHONE (OUTPATIENT)
Age: 48
End: 2023-07-20

## 2023-07-20 DIAGNOSIS — E11.65 TYPE 2 DIABETES MELLITUS WITH HYPERGLYCEMIA, WITHOUT LONG-TERM CURRENT USE OF INSULIN (HCC): Primary | ICD-10-CM

## 2023-07-20 NOTE — TELEPHONE ENCOUNTER
We received a fax refill request for [facility]. Please escribe Metformin HCL ER to their pharmacy. The pharmacy is correct in the chart and they are requesting a 90 day supply.         Patient has an appt 11/3/23

## 2023-07-22 RX ORDER — METFORMIN HYDROCHLORIDE 500 MG/1
1000 TABLET, EXTENDED RELEASE ORAL DAILY
Qty: 60 TABLET | Refills: 0 | Status: SHIPPED | OUTPATIENT
Start: 2023-07-22

## 2023-08-21 ENCOUNTER — TELEPHONE (OUTPATIENT)
Age: 48
End: 2023-08-21

## 2023-08-21 DIAGNOSIS — E11.65 TYPE 2 DIABETES MELLITUS WITH HYPERGLYCEMIA, WITHOUT LONG-TERM CURRENT USE OF INSULIN (HCC): ICD-10-CM

## 2023-08-21 RX ORDER — METFORMIN HYDROCHLORIDE 500 MG/1
1000 TABLET, EXTENDED RELEASE ORAL DAILY
Qty: 60 TABLET | Refills: 0 | Status: SHIPPED | OUTPATIENT
Start: 2023-08-21

## 2023-08-21 NOTE — TELEPHONE ENCOUNTER
We received a fax refill request for [facility]. Please escribe Metformin HCL ER to their pharmacy. The pharmacy is correct in the chart and they are requesting a 30 day supply.   Refills: 11

## 2023-09-05 DIAGNOSIS — E11.65 TYPE 2 DIABETES MELLITUS WITH HYPERGLYCEMIA, WITHOUT LONG-TERM CURRENT USE OF INSULIN (HCC): ICD-10-CM

## 2023-09-06 RX ORDER — METFORMIN HYDROCHLORIDE 500 MG/1
TABLET, EXTENDED RELEASE ORAL
Qty: 60 TABLET | Refills: 11 | OUTPATIENT
Start: 2023-09-06

## 2023-09-11 ENCOUNTER — TELEPHONE (OUTPATIENT)
Age: 48
End: 2023-09-11

## 2023-09-11 DIAGNOSIS — E11.65 TYPE 2 DIABETES MELLITUS WITH HYPERGLYCEMIA, WITHOUT LONG-TERM CURRENT USE OF INSULIN (HCC): ICD-10-CM

## 2023-09-11 RX ORDER — METFORMIN HYDROCHLORIDE 500 MG/1
1000 TABLET, EXTENDED RELEASE ORAL DAILY
Qty: 180 TABLET | Refills: 0 | Status: SHIPPED | OUTPATIENT
Start: 2023-09-11

## 2023-09-11 NOTE — TELEPHONE ENCOUNTER
Patient would like to speak with nurse about having her Metformin denied. She stated she has a CPE 11.03.2023 and was hoping in the meantime she could have this refilled.  Patient's phone: [phone]

## 2023-09-11 NOTE — TELEPHONE ENCOUNTER
Diabetes was uncontrolled at last check 10/2022. Diabetes follow-up needed ASAP    May offer a double book 9:20, 10:20, 2:00.   Please ensure slot is not already double booked  Thank you

## 2023-09-18 ENCOUNTER — OFFICE VISIT (OUTPATIENT)
Age: 48
End: 2023-09-18
Payer: COMMERCIAL

## 2023-09-18 VITALS
WEIGHT: 291.8 LBS | HEART RATE: 86 BPM | HEIGHT: 64 IN | DIASTOLIC BLOOD PRESSURE: 84 MMHG | SYSTOLIC BLOOD PRESSURE: 140 MMHG | TEMPERATURE: 98.3 F | BODY MASS INDEX: 49.82 KG/M2 | OXYGEN SATURATION: 98 %

## 2023-09-18 DIAGNOSIS — E78.00 PURE HYPERCHOLESTEROLEMIA: ICD-10-CM

## 2023-09-18 DIAGNOSIS — I10 PRIMARY HYPERTENSION: ICD-10-CM

## 2023-09-18 DIAGNOSIS — E55.9 HYPOVITAMINOSIS D: ICD-10-CM

## 2023-09-18 DIAGNOSIS — E66.01 CLASS 3 SEVERE OBESITY WITH SERIOUS COMORBIDITY AND BODY MASS INDEX (BMI) OF 50.0 TO 59.9 IN ADULT, UNSPECIFIED OBESITY TYPE (HCC): ICD-10-CM

## 2023-09-18 DIAGNOSIS — E11.65 TYPE 2 DIABETES MELLITUS WITH HYPERGLYCEMIA, WITHOUT LONG-TERM CURRENT USE OF INSULIN (HCC): Primary | ICD-10-CM

## 2023-09-18 PROCEDURE — 3052F HG A1C>EQUAL 8.0%<EQUAL 9.0%: CPT | Performed by: STUDENT IN AN ORGANIZED HEALTH CARE EDUCATION/TRAINING PROGRAM

## 2023-09-18 PROCEDURE — 3074F SYST BP LT 130 MM HG: CPT | Performed by: STUDENT IN AN ORGANIZED HEALTH CARE EDUCATION/TRAINING PROGRAM

## 2023-09-18 PROCEDURE — 3078F DIAST BP <80 MM HG: CPT | Performed by: STUDENT IN AN ORGANIZED HEALTH CARE EDUCATION/TRAINING PROGRAM

## 2023-09-18 PROCEDURE — 99214 OFFICE O/P EST MOD 30 MIN: CPT | Performed by: STUDENT IN AN ORGANIZED HEALTH CARE EDUCATION/TRAINING PROGRAM

## 2023-09-18 RX ORDER — BIOTIN 10 MG
TABLET ORAL
COMMUNITY

## 2023-09-18 RX ORDER — ROSUVASTATIN CALCIUM 10 MG/1
10 TABLET, COATED ORAL NIGHTLY
Qty: 90 TABLET | Refills: 1 | Status: SHIPPED | OUTPATIENT
Start: 2023-09-18

## 2023-09-18 RX ORDER — LISINOPRIL 10 MG/1
10 TABLET ORAL DAILY
Qty: 90 TABLET | Refills: 1 | Status: SHIPPED | OUTPATIENT
Start: 2023-09-18

## 2023-09-18 NOTE — PATIENT INSTRUCTIONS
Do your best to primarily drink water. When eating, do not multitask. Pay attention to food and drink with all your senses. When you eat out, eat just half of it and wait 10 minutes before eating any more if you are hungry. Diabetesfoodhub.org    You should purchase a blood pressure cuff to check your blood pressure at home. Check first thing in the morning. You should be relaxed, seated with back supported, feet flat on the floor, arm with cuff resting at heart height. You should check your blood pressure 1-3 times. Please write down your blood pressures and bring this record and your blood pressure cuff/machine to your follow-up visit. I would like for your blood pressure to be less than 130 for the top number and less than 90 for the bottom number.    Send a navabi message in 2-3 weeks with your blood pressure readings

## 2023-09-18 NOTE — PROGRESS NOTES
Progress Note    she is a 53y.o. year old female who presents for evaluation of Diabetes        Assessment/ Plan:     1. Type 2 diabetes mellitus with hyperglycemia, without long-term current use of insulin (HCC)  On metformin 1 g daily  Reassess with labs  Encouraged to follow a diabetic diet  -     Comprehensive Metabolic Panel; Future  -     Lipid Panel; Future  -     Microalbumin / Creatinine Urine Ratio; Future  -     Hemoglobin A1C; Future    2. Hypovitaminosis D  -     Vitamin D 25 Hydroxy; Future    3. Class 3 severe obesity with serious comorbidity and body mass index (BMI) of 50.0 to 59.9 in adult, unspecified obesity type ([address])  Healthy diet encouraged to address diabetes and other chronic diseases  -     CBC; Future  -     TSH; Future    4. Primary hypertension  Blood pressure uncontrolled, start lisinopril and monitor BP at home  Encouraged to follow a DASH diet  -     lisinopril (PRINIVIL;ZESTRIL) 10 MG tablet; Take 1 tablet by mouth daily, Disp-90 tablet, R-1Normal    5. Pure hypercholesterolemia   10/2022. Goal with diabetes <70  Start Crestor as below  -     Lipid Panel; Future  -     rosuvastatin (CRESTOR) 10 MG tablet; Take 1 tablet by mouth nightly, Disp-90 tablet, R-1Normal      Return in about 3 months (around 12/18/2023) for follow-up diabetes. I have discussed the diagnosis with the patient and the intended plan as seen in the above orders. The patient has received an after-visit summary and questions were answered concerning future plans. Pt conveyed understanding of plan. Medication Side Effects and Warnings were discussed with patient        Subjective:     Chief Complaint   Patient presents with    Diabetes     Previously tried other medications ([employer]) but insurance would not approve them. No lifestyle changes made  She keeps snacks in the house for her . He can't sleep so they stay up late. He also has health and weight issues.   High in processed

## 2023-09-19 LAB
25(OH)D3 SERPL-MCNC: 22.3 NG/ML (ref 30–100)
ALBUMIN SERPL-MCNC: 3.4 G/DL (ref 3.5–5)
ALBUMIN/GLOB SERPL: 0.9 (ref 1.1–2.2)
ALP SERPL-CCNC: 78 U/L (ref 45–117)
ALT SERPL-CCNC: 22 U/L (ref 12–78)
ANION GAP SERPL CALC-SCNC: 4 MMOL/L (ref 5–15)
AST SERPL-CCNC: 13 U/L (ref 15–37)
BILIRUB SERPL-MCNC: 0.2 MG/DL (ref 0.2–1)
BUN SERPL-MCNC: 12 MG/DL (ref 6–20)
BUN/CREAT SERPL: 15 (ref 12–20)
CALCIUM SERPL-MCNC: 9 MG/DL (ref 8.5–10.1)
CHLORIDE SERPL-SCNC: 110 MMOL/L (ref 97–108)
CHOLEST SERPL-MCNC: 149 MG/DL
CO2 SERPL-SCNC: 26 MMOL/L (ref 21–32)
CREAT SERPL-MCNC: 0.8 MG/DL (ref 0.55–1.02)
CREAT UR-MCNC: 81.2 MG/DL
ERYTHROCYTE [DISTWIDTH] IN BLOOD BY AUTOMATED COUNT: 18.5 % (ref 11.5–14.5)
EST. AVERAGE GLUCOSE BLD GHB EST-MCNC: 183 MG/DL
GLOBULIN SER CALC-MCNC: 3.6 G/DL (ref 2–4)
GLUCOSE SERPL-MCNC: 136 MG/DL (ref 65–100)
HBA1C MFR BLD: 8 % (ref 4–5.6)
HCT VFR BLD AUTO: 34.5 % (ref 35–47)
HDLC SERPL-MCNC: 39 MG/DL
HDLC SERPL: 3.8 (ref 0–5)
HGB BLD-MCNC: 10.3 G/DL (ref 11.5–16)
LDLC SERPL CALC-MCNC: 87.8 MG/DL (ref 0–100)
MCH RBC QN AUTO: 24.2 PG (ref 26–34)
MCHC RBC AUTO-ENTMCNC: 29.9 G/DL (ref 30–36.5)
MCV RBC AUTO: 81 FL (ref 80–99)
MICROALBUMIN UR-MCNC: 5.31 MG/DL
MICROALBUMIN/CREAT UR-RTO: 65 MG/G (ref 0–30)
NRBC # BLD: 0 K/UL (ref 0–0.01)
NRBC BLD-RTO: 0 PER 100 WBC
PLATELET # BLD AUTO: 297 K/UL (ref 150–400)
PMV BLD AUTO: 10.9 FL (ref 8.9–12.9)
POTASSIUM SERPL-SCNC: 4.2 MMOL/L (ref 3.5–5.1)
PROT SERPL-MCNC: 7 G/DL (ref 6.4–8.2)
RBC # BLD AUTO: 4.26 M/UL (ref 3.8–5.2)
SODIUM SERPL-SCNC: 140 MMOL/L (ref 136–145)
TRIGL SERPL-MCNC: 111 MG/DL
TSH SERPL DL<=0.05 MIU/L-ACNC: 1.11 UIU/ML (ref 0.36–3.74)
VLDLC SERPL CALC-MCNC: 22.2 MG/DL
WBC # BLD AUTO: 7.9 K/UL (ref 3.6–11)

## 2023-09-20 PROBLEM — D64.9 NORMOCYTIC ANEMIA: Status: ACTIVE | Noted: 2023-09-20

## 2023-09-20 PROBLEM — R80.9 TYPE 2 DIABETES MELLITUS WITH MICROALBUMINURIA (HCC): Status: ACTIVE | Noted: 2023-09-20

## 2023-09-20 PROBLEM — E78.00 PURE HYPERCHOLESTEROLEMIA: Status: ACTIVE | Noted: 2023-09-20

## 2023-09-20 PROBLEM — E11.29 TYPE 2 DIABETES MELLITUS WITH MICROALBUMINURIA (HCC): Status: ACTIVE | Noted: 2023-09-20

## 2023-09-20 PROBLEM — E55.9 HYPOVITAMINOSIS D: Status: ACTIVE | Noted: 2023-09-20

## 2023-11-01 ENCOUNTER — TELEPHONE (OUTPATIENT)
Age: 48
End: 2023-11-01

## 2023-12-05 ENCOUNTER — OFFICE VISIT (OUTPATIENT)
Age: 48
End: 2023-12-05
Payer: COMMERCIAL

## 2023-12-05 VITALS
HEART RATE: 76 BPM | OXYGEN SATURATION: 97 % | TEMPERATURE: 98.4 F | SYSTOLIC BLOOD PRESSURE: 129 MMHG | HEIGHT: 64 IN | WEIGHT: 287 LBS | BODY MASS INDEX: 49 KG/M2 | DIASTOLIC BLOOD PRESSURE: 78 MMHG

## 2023-12-05 DIAGNOSIS — E11.65 TYPE 2 DIABETES MELLITUS WITH HYPERGLYCEMIA, WITHOUT LONG-TERM CURRENT USE OF INSULIN (HCC): ICD-10-CM

## 2023-12-05 DIAGNOSIS — E11.29 TYPE 2 DIABETES MELLITUS WITH MICROALBUMINURIA, WITHOUT LONG-TERM CURRENT USE OF INSULIN (HCC): ICD-10-CM

## 2023-12-05 DIAGNOSIS — B35.1 ONYCHOMYCOSIS: ICD-10-CM

## 2023-12-05 DIAGNOSIS — Z12.4 CERVICAL CANCER SCREENING: Primary | ICD-10-CM

## 2023-12-05 DIAGNOSIS — R80.9 TYPE 2 DIABETES MELLITUS WITH MICROALBUMINURIA, WITHOUT LONG-TERM CURRENT USE OF INSULIN (HCC): ICD-10-CM

## 2023-12-05 PROCEDURE — S0610 ANNUAL GYNECOLOGICAL EXAMINA: HCPCS | Performed by: STUDENT IN AN ORGANIZED HEALTH CARE EDUCATION/TRAINING PROGRAM

## 2023-12-05 PROCEDURE — 99213 OFFICE O/P EST LOW 20 MIN: CPT | Performed by: STUDENT IN AN ORGANIZED HEALTH CARE EDUCATION/TRAINING PROGRAM

## 2023-12-05 NOTE — PATIENT INSTRUCTIONS
Try a topical medication with a different active ingredient than the one you've already tried. Use it consistently on all affected nails for at least 8 weeks, usually treatment is needed for 12 or more. If that doesn't take care of things, follow-up for starting lamisil (an oral medication). Make sure to clean your nail clippers between healthy and infected nails.

## 2023-12-06 RX ORDER — METFORMIN HYDROCHLORIDE 500 MG/1
1000 TABLET, EXTENDED RELEASE ORAL DAILY
Qty: 180 TABLET | Refills: 1 | Status: SHIPPED | OUTPATIENT
Start: 2023-12-06

## 2023-12-09 LAB
CYTOLOGIST CVX/VAG CYTO: NORMAL
CYTOLOGY CVX/VAG DOC CYTO: NORMAL
CYTOLOGY CVX/VAG DOC THIN PREP: NORMAL
DX ICD CODE: NORMAL
HPV GENOTYPE REFLEX: NORMAL
HPV I/H RISK 4 DNA CVX QL PROBE+SIG AMP: NEGATIVE
Lab: NORMAL
Lab: NORMAL
OTHER STN SPEC: NORMAL
STAT OF ADQ CVX/VAG CYTO-IMP: NORMAL

## 2024-02-22 DIAGNOSIS — E78.00 PURE HYPERCHOLESTEROLEMIA: ICD-10-CM

## 2024-02-22 DIAGNOSIS — I10 PRIMARY HYPERTENSION: ICD-10-CM

## 2024-02-22 RX ORDER — LISINOPRIL 10 MG/1
10 TABLET ORAL DAILY
Qty: 90 TABLET | Refills: 3 | OUTPATIENT
Start: 2024-02-22

## 2024-02-22 RX ORDER — ROSUVASTATIN CALCIUM 10 MG/1
10 TABLET, COATED ORAL NIGHTLY
Qty: 90 TABLET | Refills: 3 | OUTPATIENT
Start: 2024-02-22

## 2024-02-23 RX ORDER — ROSUVASTATIN CALCIUM 10 MG/1
10 TABLET, COATED ORAL NIGHTLY
Qty: 30 TABLET | Refills: 0 | Status: SHIPPED | OUTPATIENT
Start: 2024-02-23

## 2024-02-23 RX ORDER — LISINOPRIL 10 MG/1
10 TABLET ORAL DAILY
Qty: 30 TABLET | Refills: 0 | Status: SHIPPED | OUTPATIENT
Start: 2024-02-23

## 2024-03-11 ENCOUNTER — OFFICE VISIT (OUTPATIENT)
Age: 49
End: 2024-03-11
Payer: COMMERCIAL

## 2024-03-11 VITALS
HEIGHT: 64 IN | SYSTOLIC BLOOD PRESSURE: 132 MMHG | BODY MASS INDEX: 48.83 KG/M2 | OXYGEN SATURATION: 99 % | RESPIRATION RATE: 17 BRPM | HEART RATE: 82 BPM | WEIGHT: 286 LBS | DIASTOLIC BLOOD PRESSURE: 82 MMHG | TEMPERATURE: 97.4 F

## 2024-03-11 DIAGNOSIS — B35.1 ONYCHOMYCOSIS: ICD-10-CM

## 2024-03-11 DIAGNOSIS — I10 PRIMARY HYPERTENSION: ICD-10-CM

## 2024-03-11 DIAGNOSIS — R80.9 TYPE 2 DIABETES MELLITUS WITH MICROALBUMINURIA, WITHOUT LONG-TERM CURRENT USE OF INSULIN (HCC): Primary | ICD-10-CM

## 2024-03-11 DIAGNOSIS — E11.65 TYPE 2 DIABETES MELLITUS WITH HYPERGLYCEMIA, WITHOUT LONG-TERM CURRENT USE OF INSULIN (HCC): ICD-10-CM

## 2024-03-11 DIAGNOSIS — E55.9 HYPOVITAMINOSIS D: ICD-10-CM

## 2024-03-11 DIAGNOSIS — D64.9 NORMOCYTIC ANEMIA: ICD-10-CM

## 2024-03-11 DIAGNOSIS — E11.29 TYPE 2 DIABETES MELLITUS WITH MICROALBUMINURIA, WITHOUT LONG-TERM CURRENT USE OF INSULIN (HCC): Primary | ICD-10-CM

## 2024-03-11 DIAGNOSIS — E78.00 PURE HYPERCHOLESTEROLEMIA: ICD-10-CM

## 2024-03-11 PROCEDURE — 3079F DIAST BP 80-89 MM HG: CPT | Performed by: STUDENT IN AN ORGANIZED HEALTH CARE EDUCATION/TRAINING PROGRAM

## 2024-03-11 PROCEDURE — 99214 OFFICE O/P EST MOD 30 MIN: CPT | Performed by: STUDENT IN AN ORGANIZED HEALTH CARE EDUCATION/TRAINING PROGRAM

## 2024-03-11 PROCEDURE — 3075F SYST BP GE 130 - 139MM HG: CPT | Performed by: STUDENT IN AN ORGANIZED HEALTH CARE EDUCATION/TRAINING PROGRAM

## 2024-03-11 RX ORDER — METFORMIN HYDROCHLORIDE 500 MG/1
1000 TABLET, EXTENDED RELEASE ORAL DAILY
Qty: 180 TABLET | Refills: 1 | Status: SHIPPED | OUTPATIENT
Start: 2024-03-11 | End: 2024-03-14 | Stop reason: SDUPTHER

## 2024-03-11 RX ORDER — LISINOPRIL 10 MG/1
10 TABLET ORAL DAILY
Qty: 90 TABLET | Refills: 1 | Status: SHIPPED | OUTPATIENT
Start: 2024-03-11

## 2024-03-11 RX ORDER — ROSUVASTATIN CALCIUM 10 MG/1
10 TABLET, COATED ORAL NIGHTLY
Qty: 90 TABLET | Refills: 1 | Status: SHIPPED | OUTPATIENT
Start: 2024-03-11

## 2024-03-11 SDOH — ECONOMIC STABILITY: INCOME INSECURITY: HOW HARD IS IT FOR YOU TO PAY FOR THE VERY BASICS LIKE FOOD, HOUSING, MEDICAL CARE, AND HEATING?: NOT HARD AT ALL

## 2024-03-11 SDOH — ECONOMIC STABILITY: FOOD INSECURITY: WITHIN THE PAST 12 MONTHS, THE FOOD YOU BOUGHT JUST DIDN'T LAST AND YOU DIDN'T HAVE MONEY TO GET MORE.: NEVER TRUE

## 2024-03-11 SDOH — ECONOMIC STABILITY: FOOD INSECURITY: WITHIN THE PAST 12 MONTHS, YOU WORRIED THAT YOUR FOOD WOULD RUN OUT BEFORE YOU GOT MONEY TO BUY MORE.: NEVER TRUE

## 2024-03-11 SDOH — ECONOMIC STABILITY: HOUSING INSECURITY
IN THE LAST 12 MONTHS, WAS THERE A TIME WHEN YOU DID NOT HAVE A STEADY PLACE TO SLEEP OR SLEPT IN A SHELTER (INCLUDING NOW)?: NO

## 2024-03-11 ASSESSMENT — PATIENT HEALTH QUESTIONNAIRE - PHQ9
SUM OF ALL RESPONSES TO PHQ QUESTIONS 1-9: 0
SUM OF ALL RESPONSES TO PHQ9 QUESTIONS 1 & 2: 0
2. FEELING DOWN, DEPRESSED OR HOPELESS: 0
1. LITTLE INTEREST OR PLEASURE IN DOING THINGS: 0

## 2024-03-11 NOTE — PROGRESS NOTES
Chief Complaint   Patient presents with    Blood Work    Diabetes         \"Have you been to the ER, urgent care clinic since your last visit?  Hospitalized since your last visit?\"    NO    “Have you seen or consulted any other health care providers outside of [facility] since your last visit?”    NO    “Have you had a colorectal cancer screening such as a colonoscopy/FIT/Cologuard?    NO

## 2024-03-11 NOTE — PROGRESS NOTES
Progress Note    she is a 48 y.o. year old female who presents for evaluation of Blood Work and Diabetes        Assessment/ Plan:     1. Type 2 diabetes mellitus with microalbuminuria, without long-term current use of insulin (HCC)  Assessment & Plan:  Discussed impact of diabetes on renal health  Reevaluate today  Will increase lisinopril if persistently elevated  Orders:  -     Microalbumin / Creatinine Urine Ratio; Future  2. Type 2 diabetes mellitus with hyperglycemia, without long-term current use of insulin (HCC)  Assessment & Plan:   Unclear control, continue current medications pending work up below  Plan to increase metformin and start home blood sugar monitoring if needed  Orders:  -     Comprehensive Metabolic Panel; Future  -     Hemoglobin A1C; Future  -     metFORMIN (GLUCOPHAGE-XR) 500 MG extended release tablet; Take 2 tablets by mouth daily, Disp-180 tablet, R-1Normal  3. Hypovitaminosis D  Assessment & Plan:  No current supplement, encouraged to start  Reevaluate with labs  Orders:  -     Vitamin D 25 Hydroxy; Future  4. Normocytic anemia  -     CBC; Future  5. Pure hypercholesterolemia  Assessment & Plan:  On Crestor 10 mg daily. Unclear control, continue current medications pending work up below  Orders:  -     Lipid Panel; Future  -     rosuvastatin (CRESTOR) 10 MG tablet; Take 1 tablet by mouth nightly, Disp-90 tablet, R-1Normal  6. Onychomycosis  Assessment & Plan:  Increase use of topical, if no improvement will consider lamisil  7. Primary hypertension  Assessment & Plan:   Borderline controlled, continue current medications and start home bp monitoring  Orders:  -     lisinopril (PRINIVIL;ZESTRIL) 10 MG tablet; Take 1 tablet by mouth daily, Disp-90 tablet, R-1Normal        Patient is fasting for labs today.    Return in about 3 months (around 6/11/2024) for follow-up diabetes with labs.      I have discussed the diagnosis with the patient and the intended plan as seen in the above orders.

## 2024-03-11 NOTE — ASSESSMENT & PLAN NOTE
Unclear control, continue current medications pending work up below  Plan to increase metformin and start home blood sugar monitoring if needed

## 2024-03-11 NOTE — ASSESSMENT & PLAN NOTE
Discussed impact of diabetes on renal health  Reevaluate today  Will increase lisinopril if persistently elevated

## 2024-03-11 NOTE — PATIENT INSTRUCTIONS
You should purchase a blood pressure cuff to check your blood pressure at home.    Check first thing in the morning.  You should be relaxed, seated with back supported, feet flat on the floor, arm with cuff resting at heart height.  You should check your blood pressure 1-3 times.  Please write down your blood pressures and bring this record and your blood pressure cuff/machine to your follow-up visit.     I would like for your blood pressure to be less than 130 for the top number and less than 90 for the bottom number.   Please follow-up sooner for consistently high blood pressure readings at home.

## 2024-03-12 LAB
25(OH)D3 SERPL-MCNC: 15.8 NG/ML (ref 30–100)
ALBUMIN SERPL-MCNC: 3.4 G/DL (ref 3.5–5)
ALBUMIN/GLOB SERPL: 1 (ref 1.1–2.2)
ALP SERPL-CCNC: 74 U/L (ref 45–117)
ALT SERPL-CCNC: 23 U/L (ref 12–78)
ANION GAP SERPL CALC-SCNC: 0 MMOL/L (ref 5–15)
AST SERPL-CCNC: 13 U/L (ref 15–37)
BILIRUB SERPL-MCNC: 0.3 MG/DL (ref 0.2–1)
BUN SERPL-MCNC: 15 MG/DL (ref 6–20)
BUN/CREAT SERPL: 16 (ref 12–20)
CALCIUM SERPL-MCNC: 8.6 MG/DL (ref 8.5–10.1)
CHLORIDE SERPL-SCNC: 109 MMOL/L (ref 97–108)
CHOLEST SERPL-MCNC: 117 MG/DL
CO2 SERPL-SCNC: 27 MMOL/L (ref 21–32)
CREAT SERPL-MCNC: 0.95 MG/DL (ref 0.55–1.02)
CREAT UR-MCNC: 138 MG/DL
ERYTHROCYTE [DISTWIDTH] IN BLOOD BY AUTOMATED COUNT: 16.7 % (ref 11.5–14.5)
EST. AVERAGE GLUCOSE BLD GHB EST-MCNC: 189 MG/DL
GLOBULIN SER CALC-MCNC: 3.4 G/DL (ref 2–4)
GLUCOSE SERPL-MCNC: 176 MG/DL (ref 65–100)
HBA1C MFR BLD: 8.2 % (ref 4–5.6)
HCT VFR BLD AUTO: 35.7 % (ref 35–47)
HDLC SERPL-MCNC: 46 MG/DL
HDLC SERPL: 2.5 (ref 0–5)
HGB BLD-MCNC: 10.5 G/DL (ref 11.5–16)
LDLC SERPL CALC-MCNC: 54.4 MG/DL (ref 0–100)
MCH RBC QN AUTO: 24.2 PG (ref 26–34)
MCHC RBC AUTO-ENTMCNC: 29.4 G/DL (ref 30–36.5)
MCV RBC AUTO: 82.3 FL (ref 80–99)
MICROALBUMIN UR-MCNC: 4.16 MG/DL
MICROALBUMIN/CREAT UR-RTO: 30 MG/G (ref 0–30)
NRBC # BLD: 0 K/UL (ref 0–0.01)
NRBC BLD-RTO: 0 PER 100 WBC
PLATELET # BLD AUTO: 264 K/UL (ref 150–400)
PMV BLD AUTO: 10.6 FL (ref 8.9–12.9)
POTASSIUM SERPL-SCNC: 4.5 MMOL/L (ref 3.5–5.1)
PROT SERPL-MCNC: 6.8 G/DL (ref 6.4–8.2)
RBC # BLD AUTO: 4.34 M/UL (ref 3.8–5.2)
SODIUM SERPL-SCNC: 136 MMOL/L (ref 136–145)
TRIGL SERPL-MCNC: 83 MG/DL
VLDLC SERPL CALC-MCNC: 16.6 MG/DL
WBC # BLD AUTO: 7.2 K/UL (ref 3.6–11)

## 2024-03-14 ENCOUNTER — PATIENT MESSAGE (OUTPATIENT)
Age: 49
End: 2024-03-14

## 2024-03-14 DIAGNOSIS — E11.65 TYPE 2 DIABETES MELLITUS WITH HYPERGLYCEMIA, WITHOUT LONG-TERM CURRENT USE OF INSULIN (HCC): ICD-10-CM

## 2024-03-14 DIAGNOSIS — E55.9 HYPOVITAMINOSIS D: Primary | ICD-10-CM

## 2024-03-14 RX ORDER — METFORMIN HYDROCHLORIDE 500 MG/1
2000 TABLET, EXTENDED RELEASE ORAL DAILY
Qty: 360 TABLET | Refills: 1 | Status: SHIPPED | OUTPATIENT
Start: 2024-03-14

## 2024-03-14 RX ORDER — ERGOCALCIFEROL 1.25 MG/1
50000 CAPSULE ORAL WEEKLY
Qty: 12 CAPSULE | Refills: 1 | Status: SHIPPED | OUTPATIENT
Start: 2024-03-14

## 2024-06-18 ENCOUNTER — OFFICE VISIT (OUTPATIENT)
Age: 49
End: 2024-06-18
Payer: COMMERCIAL

## 2024-06-18 VITALS
SYSTOLIC BLOOD PRESSURE: 127 MMHG | RESPIRATION RATE: 18 BRPM | DIASTOLIC BLOOD PRESSURE: 84 MMHG | BODY MASS INDEX: 45.75 KG/M2 | OXYGEN SATURATION: 100 % | HEART RATE: 80 BPM | HEIGHT: 64 IN | WEIGHT: 268 LBS

## 2024-06-18 DIAGNOSIS — E11.65 TYPE 2 DIABETES MELLITUS WITH HYPERGLYCEMIA, WITHOUT LONG-TERM CURRENT USE OF INSULIN (HCC): Primary | ICD-10-CM

## 2024-06-18 DIAGNOSIS — E55.9 HYPOVITAMINOSIS D: ICD-10-CM

## 2024-06-18 DIAGNOSIS — I10 PRIMARY HYPERTENSION: ICD-10-CM

## 2024-06-18 DIAGNOSIS — B35.1 ONYCHOMYCOSIS: ICD-10-CM

## 2024-06-18 DIAGNOSIS — D64.9 NORMOCYTIC ANEMIA: ICD-10-CM

## 2024-06-18 DIAGNOSIS — E78.00 PURE HYPERCHOLESTEROLEMIA: ICD-10-CM

## 2024-06-18 PROCEDURE — 3052F HG A1C>EQUAL 8.0%<EQUAL 9.0%: CPT | Performed by: STUDENT IN AN ORGANIZED HEALTH CARE EDUCATION/TRAINING PROGRAM

## 2024-06-18 PROCEDURE — 99214 OFFICE O/P EST MOD 30 MIN: CPT | Performed by: STUDENT IN AN ORGANIZED HEALTH CARE EDUCATION/TRAINING PROGRAM

## 2024-06-18 PROCEDURE — 3074F SYST BP LT 130 MM HG: CPT | Performed by: STUDENT IN AN ORGANIZED HEALTH CARE EDUCATION/TRAINING PROGRAM

## 2024-06-18 PROCEDURE — 3079F DIAST BP 80-89 MM HG: CPT | Performed by: STUDENT IN AN ORGANIZED HEALTH CARE EDUCATION/TRAINING PROGRAM

## 2024-06-18 RX ORDER — TERBINAFINE HYDROCHLORIDE 250 MG/1
250 TABLET ORAL DAILY
Qty: 84 TABLET | Refills: 0 | Status: SHIPPED | OUTPATIENT
Start: 2024-06-18 | End: 2024-09-10

## 2024-06-18 NOTE — PROGRESS NOTES
Chief Complaint   Patient presents with    Diabetes    Blood Work     Patient is fasting for labs today.      Patient gave verbal consent today for MAGALIE.    Requested records for patient today.    Accompanied by: n/a    Home BP cuff present today:  no  Home medication list or bottles present today: no  Forms for completion: no    Chest pain/pressure/discomfort: no  Shortness of breath:  no  If new or worsening symptoms, consider EKG.    \"Have you been to the ER, urgent care clinic since your last visit?  Hospitalized since your last visit?\"    NO    “Have you seen or consulted any other health care providers outside of [facility] since your last visit?”    NO    Have you had a mammogram?”   NO    Date of last Mammogram: 10/5/2017         “Have you had a colorectal cancer screening such as a colonoscopy/FIT/Cologuard?    NO    No colonoscopy on file  No cologuard on file  No FIT/FOBT on file   No flexible sigmoidoscopy on file         Click Here for Release of Records Request

## 2024-06-18 NOTE — PROGRESS NOTES
Progress Note    she is a 48 y.o. year old female who presents for evaluation of Diabetes and Blood Work (Patient is fasting for labs today. )        Assessment/ Plan:     1. Type 2 diabetes mellitus with hyperglycemia, without long-term current use of insulin ([facility])  -     blood glucose test strips (ASCENSIA AUTODISC VI;ONE TOUCH ULTRA TEST VI) strip; DAILY Starting Tue 6/18/2024, Disp-100 each, R-3, NormalAs needed.  -     Comprehensive Metabolic Panel; Future  -     Hemoglobin A1C; Future  -      DIABETES FOOT EXAM  2. Body mass index (BMI) 45.0-49.9, adult ([facility])  3. Normocytic anemia  -     CBC; Future  4. Hypovitaminosis D  -     Vitamin D 25 Hydroxy; Future  5. Pure hypercholesterolemia  -     Lipid Panel; Future  6. Primary hypertension  7. Onychomycosis  -     terbinafine (LAMISIL) 250 MG tablet; Take 1 tablet by mouth daily, Disp-84 tablet, R-0Normal  -     Hepatic Function Panel; Future  -     Hepatic Function Panel; Future      Results  Laboratory Studies  Protein in urine returned to normal on last labs. Vitamin D was low.    Assessment & Plan  1. Diabetes.  The patient's weight has decreased by approximately 20 pounds, and her blood pressure is well-regulated. Proteinuria has returned to normal, and her vitamin D levels are low. A prescription for test strips will be issued.     2.  Onychomycosis  Responding to topical antifungal  Start Lamisil  A recheck of her liver enzymes will be conducted in 2 weeks, and again in 6 weeks.        Patient is fasting for labs today.    Return in about 3 months (around 9/18/2024) for follow-up diabetes with labs 3-4 months.      I have discussed the diagnosis with the patient and the intended plan as seen in the above orders.    The patient has received an after-visit summary and questions were answered concerning future plans.   Pt conveyed understanding of plan.    Medication Side Effects and Warnings were discussed with patient    Current Outpatient Medications

## 2024-06-19 LAB
25(OH)D3 SERPL-MCNC: 82.5 NG/ML (ref 30–100)
ALBUMIN SERPL-MCNC: 3.4 G/DL (ref 3.5–5)
ALBUMIN/GLOB SERPL: 1 (ref 1.1–2.2)
ALP SERPL-CCNC: 63 U/L (ref 45–117)
ALT SERPL-CCNC: 20 U/L (ref 12–78)
ANION GAP SERPL CALC-SCNC: 5 MMOL/L (ref 5–15)
AST SERPL-CCNC: 17 U/L (ref 15–37)
BILIRUB SERPL-MCNC: 0.3 MG/DL (ref 0.2–1)
BUN SERPL-MCNC: 12 MG/DL (ref 6–20)
BUN/CREAT SERPL: 14 (ref 12–20)
CALCIUM SERPL-MCNC: 8.8 MG/DL (ref 8.5–10.1)
CHLORIDE SERPL-SCNC: 111 MMOL/L (ref 97–108)
CHOLEST SERPL-MCNC: 103 MG/DL
CO2 SERPL-SCNC: 26 MMOL/L (ref 21–32)
CREAT SERPL-MCNC: 0.88 MG/DL (ref 0.55–1.02)
ERYTHROCYTE [DISTWIDTH] IN BLOOD BY AUTOMATED COUNT: 17.2 % (ref 11.5–14.5)
EST. AVERAGE GLUCOSE BLD GHB EST-MCNC: 134 MG/DL
GLOBULIN SER CALC-MCNC: 3.4 G/DL (ref 2–4)
GLUCOSE SERPL-MCNC: 125 MG/DL (ref 65–100)
HBA1C MFR BLD: 6.3 % (ref 4–5.6)
HCT VFR BLD AUTO: 34.8 % (ref 35–47)
HDLC SERPL-MCNC: 43 MG/DL
HDLC SERPL: 2.4 (ref 0–5)
HGB BLD-MCNC: 10.4 G/DL (ref 11.5–16)
LDLC SERPL CALC-MCNC: 43.2 MG/DL (ref 0–100)
MCH RBC QN AUTO: 24.8 PG (ref 26–34)
MCHC RBC AUTO-ENTMCNC: 29.9 G/DL (ref 30–36.5)
MCV RBC AUTO: 82.9 FL (ref 80–99)
NRBC # BLD: 0 K/UL (ref 0–0.01)
NRBC BLD-RTO: 0 PER 100 WBC
PLATELET # BLD AUTO: 287 K/UL (ref 150–400)
PMV BLD AUTO: 10.9 FL (ref 8.9–12.9)
POTASSIUM SERPL-SCNC: 4.6 MMOL/L (ref 3.5–5.1)
PROT SERPL-MCNC: 6.8 G/DL (ref 6.4–8.2)
RBC # BLD AUTO: 4.2 M/UL (ref 3.8–5.2)
SODIUM SERPL-SCNC: 142 MMOL/L (ref 136–145)
TRIGL SERPL-MCNC: 84 MG/DL
VLDLC SERPL CALC-MCNC: 16.8 MG/DL
WBC # BLD AUTO: 6.7 K/UL (ref 3.6–11)

## 2024-07-02 DIAGNOSIS — B35.1 ONYCHOMYCOSIS: ICD-10-CM

## 2024-07-30 DIAGNOSIS — B35.1 ONYCHOMYCOSIS: ICD-10-CM

## 2024-09-09 DIAGNOSIS — E78.00 PURE HYPERCHOLESTEROLEMIA: ICD-10-CM

## 2024-09-09 DIAGNOSIS — I10 PRIMARY HYPERTENSION: ICD-10-CM

## 2024-09-09 RX ORDER — ROSUVASTATIN CALCIUM 10 MG/1
10 TABLET, COATED ORAL NIGHTLY
Qty: 90 TABLET | Refills: 1 | Status: SHIPPED | OUTPATIENT
Start: 2024-09-09

## 2024-09-09 RX ORDER — LISINOPRIL 10 MG/1
10 TABLET ORAL DAILY
Qty: 90 TABLET | Refills: 1 | Status: SHIPPED | OUTPATIENT
Start: 2024-09-09

## 2024-09-13 DIAGNOSIS — E11.65 TYPE 2 DIABETES MELLITUS WITH HYPERGLYCEMIA, WITHOUT LONG-TERM CURRENT USE OF INSULIN (HCC): ICD-10-CM

## 2024-09-13 RX ORDER — METFORMIN HCL 500 MG
TABLET, EXTENDED RELEASE 24 HR ORAL
Qty: 360 TABLET | Refills: 0 | Status: SHIPPED | OUTPATIENT
Start: 2024-09-13

## 2024-12-12 DIAGNOSIS — E11.65 TYPE 2 DIABETES MELLITUS WITH HYPERGLYCEMIA, WITHOUT LONG-TERM CURRENT USE OF INSULIN (HCC): ICD-10-CM

## 2024-12-13 RX ORDER — METFORMIN HYDROCHLORIDE 500 MG/1
TABLET, EXTENDED RELEASE ORAL
Qty: 360 TABLET | Refills: 0 | Status: SHIPPED | OUTPATIENT
Start: 2024-12-13

## 2024-12-13 NOTE — TELEPHONE ENCOUNTER
Refill sent.  Please contact patient to schedule diabetes follow-up (due 12/2024), next routine available with any provider.

## 2025-02-04 ENCOUNTER — COMMUNITY OUTREACH (OUTPATIENT)
Facility: CLINIC | Age: 50
End: 2025-02-04

## 2025-02-04 NOTE — PROGRESS NOTES
Patient's HM shows they are overdue for Colorectal Screening.   Care Everywhere and  files searched.  No results to attach to order nor HM updated.      Patient's HM shows they are overdue for Mammogram.  Care Everywhere and  files searched.  No results to attach to order nor HM updated.

## 2025-03-09 DIAGNOSIS — I10 PRIMARY HYPERTENSION: ICD-10-CM

## 2025-03-09 DIAGNOSIS — E78.00 PURE HYPERCHOLESTEROLEMIA: ICD-10-CM

## 2025-03-11 RX ORDER — LISINOPRIL 10 MG/1
10 TABLET ORAL DAILY
Qty: 90 TABLET | Refills: 3 | OUTPATIENT
Start: 2025-03-11

## 2025-03-11 RX ORDER — ROSUVASTATIN CALCIUM 10 MG/1
10 TABLET, COATED ORAL NIGHTLY
Qty: 90 TABLET | Refills: 3 | OUTPATIENT
Start: 2025-03-11

## 2025-03-11 NOTE — TELEPHONE ENCOUNTER
Please contact patient, they are overdue for follow-up.    Follow-up must be scheduled and a short term refill will be provided.    Please offer next routine available virtual or in office, any available provider based on patient's preference.

## 2025-03-12 DIAGNOSIS — E11.65 TYPE 2 DIABETES MELLITUS WITH HYPERGLYCEMIA, WITHOUT LONG-TERM CURRENT USE OF INSULIN (HCC): ICD-10-CM

## 2025-03-12 RX ORDER — METFORMIN HYDROCHLORIDE 500 MG/1
TABLET, EXTENDED RELEASE ORAL
Qty: 360 TABLET | Refills: 3 | OUTPATIENT
Start: 2025-03-12

## 2025-03-17 ENCOUNTER — TELEMEDICINE (OUTPATIENT)
Facility: CLINIC | Age: 50
End: 2025-03-17
Payer: COMMERCIAL

## 2025-03-17 DIAGNOSIS — I10 PRIMARY HYPERTENSION: ICD-10-CM

## 2025-03-17 DIAGNOSIS — E11.29 TYPE 2 DIABETES MELLITUS WITH MICROALBUMINURIA, WITHOUT LONG-TERM CURRENT USE OF INSULIN (HCC): ICD-10-CM

## 2025-03-17 DIAGNOSIS — E78.00 PURE HYPERCHOLESTEROLEMIA: ICD-10-CM

## 2025-03-17 DIAGNOSIS — R80.9 TYPE 2 DIABETES MELLITUS WITH MICROALBUMINURIA, WITHOUT LONG-TERM CURRENT USE OF INSULIN (HCC): ICD-10-CM

## 2025-03-17 DIAGNOSIS — D64.9 NORMOCYTIC ANEMIA: ICD-10-CM

## 2025-03-17 DIAGNOSIS — E55.9 HYPOVITAMINOSIS D: Primary | ICD-10-CM

## 2025-03-17 DIAGNOSIS — E11.65 TYPE 2 DIABETES MELLITUS WITH HYPERGLYCEMIA, WITHOUT LONG-TERM CURRENT USE OF INSULIN (HCC): ICD-10-CM

## 2025-03-17 PROCEDURE — 99214 OFFICE O/P EST MOD 30 MIN: CPT | Performed by: STUDENT IN AN ORGANIZED HEALTH CARE EDUCATION/TRAINING PROGRAM

## 2025-03-17 RX ORDER — LISINOPRIL 10 MG/1
10 TABLET ORAL DAILY
Qty: 90 TABLET | Refills: 1 | Status: SHIPPED | OUTPATIENT
Start: 2025-03-17

## 2025-03-17 RX ORDER — METFORMIN HYDROCHLORIDE 500 MG/1
1000 TABLET, EXTENDED RELEASE ORAL 2 TIMES DAILY
Qty: 360 TABLET | Refills: 1 | Status: SHIPPED | OUTPATIENT
Start: 2025-03-17

## 2025-03-17 RX ORDER — ROSUVASTATIN CALCIUM 10 MG/1
10 TABLET, COATED ORAL NIGHTLY
Qty: 90 TABLET | Refills: 1 | Status: SHIPPED | OUTPATIENT
Start: 2025-03-17

## 2025-03-17 SDOH — ECONOMIC STABILITY: FOOD INSECURITY: WITHIN THE PAST 12 MONTHS, THE FOOD YOU BOUGHT JUST DIDN'T LAST AND YOU DIDN'T HAVE MONEY TO GET MORE.: NEVER TRUE

## 2025-03-17 SDOH — ECONOMIC STABILITY: FOOD INSECURITY: WITHIN THE PAST 12 MONTHS, YOU WORRIED THAT YOUR FOOD WOULD RUN OUT BEFORE YOU GOT MONEY TO BUY MORE.: NEVER TRUE

## 2025-03-17 ASSESSMENT — PATIENT HEALTH QUESTIONNAIRE - PHQ9
1. LITTLE INTEREST OR PLEASURE IN DOING THINGS: NOT AT ALL
SUM OF ALL RESPONSES TO PHQ QUESTIONS 1-9: 0
2. FEELING DOWN, DEPRESSED OR HOPELESS: NOT AT ALL
SUM OF ALL RESPONSES TO PHQ QUESTIONS 1-9: 0

## 2025-03-17 NOTE — PROGRESS NOTES
Virtual Visit Progress Note    she is a 49 y.o. year old female who presents for evaluation Diabetes and Medication Refill        Assessment/ Plan:     Assessment & Plan  1. Diabetes Mellitus.  She is currently on metformin. The potential benefits and side effects of Ozempic were discussed, including its gastrointestinal side effects such as heartburn, nausea, vomiting, constipation, and diarrhea. A lab order will be issued to assess her A1c levels before considering any medication changes. She is advised to hold off on any medication changes until the lab results are available. If a switch to Ozempic is made, it will start at the lowest dose of 0.25 mg, with a gradual increase to help mitigate side effects. She should have a glucometer on hand to monitor her blood sugar levels, especially if she feels they are too low. An A1c check is recommended at the 3 to 4-month valeria following any medication change.    2. Vitamin D Deficiency.  A lab order will be issued to assess her vitamin D levels. The results will guide the appropriate dosage for her over-the-counter supplement.    3. Hypertension.  Her blood pressure readings have been within the range of 120 to 130 systolic. She is advised to maintain her blood pressure consistently below 130. She is currently on lisinopril 10 mg, and there is room to increase the dosage if necessary, up to a maximum of 40 mg.    4. Onychomycosis.  She reports that her nail fungus appears to have cleared up after discontinuing the medication. She will continue to monitor the condition.       1. Hypovitaminosis D  -     Vitamin D 25 Hydroxy; Future  2. Primary hypertension  -     lisinopril (PRINIVIL;ZESTRIL) 10 MG tablet; Take 1 tablet by mouth daily, Disp-90 tablet, R-1Normal  3. Type 2 diabetes mellitus with hyperglycemia, without long-term current use of insulin (HCC)  -     metFORMIN (GLUCOPHAGE-XR) 500 MG extended release tablet; Take 2 tablets by mouth in the morning and at

## 2025-03-17 NOTE — PROGRESS NOTES
The patient (or guardian, if applicable) and other individuals in attendance with the patient were advised that Artificial Intelligence will be utilized during this visit to record, process the conversation to generate a clinical note, and support improvement of the AI technology. The patient (or guardian, if applicable) and other individuals in attendance at the appointment consented to the use of AI, including the recording.      Patient confirms they are located here in [place] for this virtual visit today.    Rose Alanis is a 49 y.o. female , id x 2(name and ). Reviewed record, history, and  medications.    Chief Complaint   Patient presents with    Diabetes    Medication Refill        Health Maintenance Due   Topic    HIV screen     Diabetic retinal exam     Hepatitis B vaccine (1 of 3 - 19+ 3-dose series)    DTaP/Tdap/Td vaccine (1 - Tdap)    Pneumococcal 0-49 years Vaccine (1 of 2 - PCV)    Breast cancer screen     Colorectal Cancer Screen     Flu vaccine (1)    COVID-19 Vaccine (3 - 2024-25 season)    Diabetic Alb to Cr ratio (uACR) test     Depression Screen        Wt Readings from Last 1 Encounters:   24 121.6 kg (268 lb)     BP Readings from Last 3 Encounters:   24 127/84   24 132/82   23 129/78     Pulse Readings from Last 1 Encounters:   24 80       Surgery within the next month: no    Forms for completion: no    Chest pain/SOB: no        Depression Screening:  :     Depression: Not at risk (3/17/2025)    PHQ-2     PHQ-2 Score: 0          Coordination of Care Questionnaire:  :     \"Have you been to the ER, urgent care clinic since your last visit?  Hospitalized since your last visit?\"    NO    “Have you seen or consulted any other health care providers outside of [facility] since your last visit?”    NO    Have you had a mammogram?”   NO    Date of last Mammogram: 10/5/2017         “Have you had a colorectal cancer screening such as a

## 2025-04-22 ENCOUNTER — TELEPHONE (OUTPATIENT)
Facility: CLINIC | Age: 50
End: 2025-04-22

## 2025-04-22 NOTE — TELEPHONE ENCOUNTER
Patient called and states that she was supposed to be getting labs sent to her to go get them done. The labs in the system say . Please put new labs in and call patient at [phone] to set lab appointment

## 2025-07-09 ENCOUNTER — TELEPHONE (OUTPATIENT)
Facility: CLINIC | Age: 50
End: 2025-07-09

## 2025-07-09 DIAGNOSIS — E11.65 TYPE 2 DIABETES MELLITUS WITH HYPERGLYCEMIA, WITHOUT LONG-TERM CURRENT USE OF INSULIN (HCC): ICD-10-CM

## 2025-07-09 NOTE — TELEPHONE ENCOUNTER
We received a fax refill request for Rose Alanis.  Please escribe one touch verio strips 100s to their pharmacy.  The pharmacy is correct in the chart and they are requesting a ? day supply.

## 2025-07-15 ENCOUNTER — TELEPHONE (OUTPATIENT)
Facility: CLINIC | Age: 50
End: 2025-07-15

## 2025-07-15 RX ORDER — ALCOHOL ANTISEPTIC PADS
1 PADS, MEDICATED (EA) TOPICAL DAILY
COMMUNITY
End: 2025-07-15 | Stop reason: SDUPTHER

## 2025-07-15 RX ORDER — ALCOHOL ANTISEPTIC PADS
1 PADS, MEDICATED (EA) TOPICAL DAILY
Qty: 100 EACH | Refills: 3 | Status: SHIPPED | OUTPATIENT
Start: 2025-07-15